# Patient Record
Sex: MALE | Race: OTHER | HISPANIC OR LATINO | URBAN - METROPOLITAN AREA
[De-identification: names, ages, dates, MRNs, and addresses within clinical notes are randomized per-mention and may not be internally consistent; named-entity substitution may affect disease eponyms.]

---

## 2020-03-29 ENCOUNTER — INPATIENT (INPATIENT)
Facility: HOSPITAL | Age: 55
LOS: 0 days | Discharge: ANOTHER HOSPITAL | DRG: 193 | End: 2020-03-30
Attending: HOSPITALIST | Admitting: HOSPITALIST
Payer: MEDICAID

## 2020-03-29 VITALS
RESPIRATION RATE: 20 BRPM | OXYGEN SATURATION: 85 % | TEMPERATURE: 102 F | HEART RATE: 106 BPM | WEIGHT: 199.96 LBS | HEIGHT: 65 IN | DIASTOLIC BLOOD PRESSURE: 70 MMHG | SYSTOLIC BLOOD PRESSURE: 110 MMHG

## 2020-03-29 DIAGNOSIS — J96.01 ACUTE RESPIRATORY FAILURE WITH HYPOXIA: ICD-10-CM

## 2020-03-29 DIAGNOSIS — R19.7 DIARRHEA, UNSPECIFIED: ICD-10-CM

## 2020-03-29 DIAGNOSIS — J18.9 PNEUMONIA, UNSPECIFIED ORGANISM: ICD-10-CM

## 2020-03-29 DIAGNOSIS — Z29.9 ENCOUNTER FOR PROPHYLACTIC MEASURES, UNSPECIFIED: ICD-10-CM

## 2020-03-29 LAB
ALBUMIN SERPL ELPH-MCNC: 3.1 G/DL — LOW (ref 3.5–5)
ALP SERPL-CCNC: 63 U/L — SIGNIFICANT CHANGE UP (ref 40–120)
ALT FLD-CCNC: 84 U/L DA — HIGH (ref 10–60)
ANION GAP SERPL CALC-SCNC: 7 MMOL/L — SIGNIFICANT CHANGE UP (ref 5–17)
APTT BLD: 35 SEC — SIGNIFICANT CHANGE UP (ref 27.5–36.3)
AST SERPL-CCNC: 79 U/L — HIGH (ref 10–40)
BASOPHILS # BLD AUTO: 0.02 K/UL — SIGNIFICANT CHANGE UP (ref 0–0.2)
BASOPHILS NFR BLD AUTO: 0.2 % — SIGNIFICANT CHANGE UP (ref 0–2)
BILIRUB SERPL-MCNC: 0.8 MG/DL — SIGNIFICANT CHANGE UP (ref 0.2–1.2)
BUN SERPL-MCNC: 25 MG/DL — HIGH (ref 7–18)
CALCIUM SERPL-MCNC: 9.1 MG/DL — SIGNIFICANT CHANGE UP (ref 8.4–10.5)
CHLORIDE SERPL-SCNC: 105 MMOL/L — SIGNIFICANT CHANGE UP (ref 96–108)
CO2 SERPL-SCNC: 24 MMOL/L — SIGNIFICANT CHANGE UP (ref 22–31)
CREAT SERPL-MCNC: 1.29 MG/DL — SIGNIFICANT CHANGE UP (ref 0.5–1.3)
EOSINOPHIL # BLD AUTO: 0 K/UL — SIGNIFICANT CHANGE UP (ref 0–0.5)
EOSINOPHIL NFR BLD AUTO: 0 % — SIGNIFICANT CHANGE UP (ref 0–6)
FLU A RESULT: SIGNIFICANT CHANGE UP
FLU A RESULT: SIGNIFICANT CHANGE UP
FLUAV AG NPH QL: SIGNIFICANT CHANGE UP
FLUBV AG NPH QL: SIGNIFICANT CHANGE UP
GLUCOSE SERPL-MCNC: 116 MG/DL — HIGH (ref 70–99)
HCT VFR BLD CALC: 47.2 % — SIGNIFICANT CHANGE UP (ref 39–50)
HGB BLD-MCNC: 15.8 G/DL — SIGNIFICANT CHANGE UP (ref 13–17)
IMM GRANULOCYTES NFR BLD AUTO: 0.7 % — SIGNIFICANT CHANGE UP (ref 0–1.5)
INR BLD: 1.3 RATIO — HIGH (ref 0.88–1.16)
LACTATE SERPL-SCNC: 1.3 MMOL/L — SIGNIFICANT CHANGE UP (ref 0.7–2)
LYMPHOCYTES # BLD AUTO: 0.81 K/UL — LOW (ref 1–3.3)
LYMPHOCYTES # BLD AUTO: 7.4 % — LOW (ref 13–44)
MCHC RBC-ENTMCNC: 29.8 PG — SIGNIFICANT CHANGE UP (ref 27–34)
MCHC RBC-ENTMCNC: 33.5 GM/DL — SIGNIFICANT CHANGE UP (ref 32–36)
MCV RBC AUTO: 88.9 FL — SIGNIFICANT CHANGE UP (ref 80–100)
MONOCYTES # BLD AUTO: 0.76 K/UL — SIGNIFICANT CHANGE UP (ref 0–0.9)
MONOCYTES NFR BLD AUTO: 7 % — SIGNIFICANT CHANGE UP (ref 2–14)
NEUTROPHILS # BLD AUTO: 9.23 K/UL — HIGH (ref 1.8–7.4)
NEUTROPHILS NFR BLD AUTO: 84.7 % — HIGH (ref 43–77)
NRBC # BLD: 0 /100 WBCS — SIGNIFICANT CHANGE UP (ref 0–0)
PLATELET # BLD AUTO: 321 K/UL — SIGNIFICANT CHANGE UP (ref 150–400)
POTASSIUM SERPL-MCNC: 4 MMOL/L — SIGNIFICANT CHANGE UP (ref 3.5–5.3)
POTASSIUM SERPL-SCNC: 4 MMOL/L — SIGNIFICANT CHANGE UP (ref 3.5–5.3)
PROT SERPL-MCNC: 8.9 G/DL — HIGH (ref 6–8.3)
PROTHROM AB SERPL-ACNC: 14.8 SEC — HIGH (ref 10–12.9)
RBC # BLD: 5.31 M/UL — SIGNIFICANT CHANGE UP (ref 4.2–5.8)
RBC # FLD: 13.3 % — SIGNIFICANT CHANGE UP (ref 10.3–14.5)
RSV RESULT: SIGNIFICANT CHANGE UP
RSV RNA RESP QL NAA+PROBE: SIGNIFICANT CHANGE UP
SODIUM SERPL-SCNC: 136 MMOL/L — SIGNIFICANT CHANGE UP (ref 135–145)
WBC # BLD: 10.9 K/UL — HIGH (ref 3.8–10.5)
WBC # FLD AUTO: 10.9 K/UL — HIGH (ref 3.8–10.5)

## 2020-03-29 PROCEDURE — 71045 X-RAY EXAM CHEST 1 VIEW: CPT | Mod: 26

## 2020-03-29 PROCEDURE — 99222 1ST HOSP IP/OBS MODERATE 55: CPT | Mod: AI,GC

## 2020-03-29 PROCEDURE — 99285 EMERGENCY DEPT VISIT HI MDM: CPT

## 2020-03-29 RX ORDER — ACETAMINOPHEN 500 MG
650 TABLET ORAL ONCE
Refills: 0 | Status: COMPLETED | OUTPATIENT
Start: 2020-03-29 | End: 2020-03-29

## 2020-03-29 RX ORDER — HYDROXYCHLOROQUINE SULFATE 200 MG
TABLET ORAL
Refills: 0 | Status: DISCONTINUED | OUTPATIENT
Start: 2020-03-29 | End: 2020-03-30

## 2020-03-29 RX ORDER — ACETAMINOPHEN 500 MG
650 TABLET ORAL EVERY 6 HOURS
Refills: 0 | Status: DISCONTINUED | OUTPATIENT
Start: 2020-03-29 | End: 2020-03-30

## 2020-03-29 RX ORDER — ENOXAPARIN SODIUM 100 MG/ML
40 INJECTION SUBCUTANEOUS DAILY
Refills: 0 | Status: DISCONTINUED | OUTPATIENT
Start: 2020-03-29 | End: 2020-03-30

## 2020-03-29 RX ORDER — ASCORBIC ACID 60 MG
500 TABLET,CHEWABLE ORAL THREE TIMES A DAY
Refills: 0 | Status: DISCONTINUED | OUTPATIENT
Start: 2020-03-29 | End: 2020-03-30

## 2020-03-29 RX ORDER — SODIUM CHLORIDE 9 MG/ML
1000 INJECTION INTRAMUSCULAR; INTRAVENOUS; SUBCUTANEOUS
Refills: 0 | Status: DISCONTINUED | OUTPATIENT
Start: 2020-03-29 | End: 2020-03-29

## 2020-03-29 RX ORDER — HYDROXYCHLOROQUINE SULFATE 200 MG
200 TABLET ORAL EVERY 12 HOURS
Refills: 0 | Status: DISCONTINUED | OUTPATIENT
Start: 2020-03-30 | End: 2020-03-30

## 2020-03-29 RX ORDER — CEFTRIAXONE 500 MG/1
INJECTION, POWDER, FOR SOLUTION INTRAMUSCULAR; INTRAVENOUS
Refills: 0 | Status: DISCONTINUED | OUTPATIENT
Start: 2020-03-29 | End: 2020-03-30

## 2020-03-29 RX ORDER — HYDROXYCHLOROQUINE SULFATE 200 MG
400 TABLET ORAL EVERY 12 HOURS
Refills: 0 | Status: COMPLETED | OUTPATIENT
Start: 2020-03-29 | End: 2020-03-30

## 2020-03-29 RX ORDER — CEFTRIAXONE 500 MG/1
1000 INJECTION, POWDER, FOR SOLUTION INTRAMUSCULAR; INTRAVENOUS EVERY 24 HOURS
Refills: 0 | Status: DISCONTINUED | OUTPATIENT
Start: 2020-03-30 | End: 2020-03-30

## 2020-03-29 RX ORDER — SODIUM CHLORIDE 9 MG/ML
1000 INJECTION INTRAMUSCULAR; INTRAVENOUS; SUBCUTANEOUS
Refills: 0 | Status: DISCONTINUED | OUTPATIENT
Start: 2020-03-29 | End: 2020-03-30

## 2020-03-29 RX ORDER — CEFTRIAXONE 500 MG/1
1000 INJECTION, POWDER, FOR SOLUTION INTRAMUSCULAR; INTRAVENOUS ONCE
Refills: 0 | Status: COMPLETED | OUTPATIENT
Start: 2020-03-29 | End: 2020-03-29

## 2020-03-29 RX ORDER — THIAMINE MONONITRATE (VIT B1) 100 MG
200 TABLET ORAL DAILY
Refills: 0 | Status: DISCONTINUED | OUTPATIENT
Start: 2020-03-29 | End: 2020-03-30

## 2020-03-29 RX ORDER — AZITHROMYCIN 500 MG/1
500 TABLET, FILM COATED ORAL DAILY
Refills: 0 | Status: DISCONTINUED | OUTPATIENT
Start: 2020-03-29 | End: 2020-03-30

## 2020-03-29 RX ADMIN — AZITHROMYCIN 500 MILLIGRAM(S): 500 TABLET, FILM COATED ORAL at 18:00

## 2020-03-29 RX ADMIN — Medication 650 MILLIGRAM(S): at 14:05

## 2020-03-29 RX ADMIN — CEFTRIAXONE 100 MILLIGRAM(S): 500 INJECTION, POWDER, FOR SOLUTION INTRAMUSCULAR; INTRAVENOUS at 18:00

## 2020-03-29 NOTE — H&P ADULT - HISTORY OF PRESENT ILLNESS
Patient is a 55 year old male from home with No significant  PMHx presented to ED with cough and SOB with 7 days. Patient reports of cough, low grade fever, SOB, abdominal discomfort and Diarrhea. Patient reports of 10 episodes of watery bowel movements since 2-3 days. Patient denies smoking, asthma, copd, Diabetes, HTN or h/o Heart disease. Patient denies headaches, nausea, vomiting, dysuria, skin rashes or focal motor/ sensory deficits  denies smoking or alcohol abuse     on arrival pt was febrile, Tmax 102F, HD stable, tachypneic and Hypoxic on room air   cbc showed mild leucocytosis  normal electrolytes and renal functions   CXR small infiltrate in the left mid lateral lung field. Patient is a 54 year old male from home with No significant  PMHx presented to ED with cough and SOB with 7 days. Patient reports of cough, low grade fever, SOB, abdominal discomfort and Diarrhea. Patient reports of 10 episodes of watery bowel movements since 2-3 days. Patient denies smoking, asthma, copd, Diabetes, HTN or h/o Heart disease. Patient denies headaches, nausea, vomiting, dysuria, skin rashes or focal motor/ sensory deficits  denies smoking or alcohol abuse     on arrival pt was febrile, Tmax 102F, HD stable, tachypneic and Hypoxic on room air   cbc showed mild leucocytosis  normal electrolytes and renal functions   CXR small infiltrate in the left mid lateral lung field.

## 2020-03-29 NOTE — ED ADULT NURSE NOTE - NSIMPLEMENTINTERV_GEN_ALL_ED
Implemented All Universal Safety Interventions:  [place] to call system. Call bell, personal items and telephone within reach. Instruct patient to call for assistance. Room bathroom lighting operational. Non-slip footwear when patient is off stretcher. Physically safe environment: no spills, clutter or unnecessary equipment. Stretcher in lowest position, wheels locked, appropriate side rails in place.

## 2020-03-29 NOTE — H&P ADULT - PROBLEM SELECTOR PLAN 2
Patient was found to be hypoxic on room air   Hypoxic likely due to PNA   continue with O2 supplementation   IV antibiotics

## 2020-03-29 NOTE — H&P ADULT - PROBLEM SELECTOR PLAN 1
Patient presented with sob, fever, and Diarrhea  on arrival pt was febrile, Tmax 102F, HD stable, tachypneic and Hypoxic on room air   cbc showed mild leucocytosis  normal electrolytes and renal functions   CXR small infiltrate in the left mid lateral lung field.   continue with Rocephin and Azithromax   f/u FLU, RVP, COVID 19, Procalcitonin   f/u Blood cx Patient presented with sob, fever, and Diarrhea  on arrival pt was febrile, Tmax 102F, HD stable, tachypneic and Hypoxic on room air   cbc showed mild leucocytosis  normal electrolytes and renal functions   CXR small infiltrate in the left mid lateral lung field.   continue with Rocephin and Azithromax   f/u FLU, RVP, COVID 19, Procalcitonin   f/u Blood cx  Hydroxy chloroquine for COVID coverage

## 2020-03-29 NOTE — ED PROVIDER NOTE - CLINICAL SUMMARY MEDICAL DECISION MAKING FREE TEXT BOX
pt presents with flu like symptoms.  Pt febrile, hypoxic, tachypnea.  Likely covid.  pt on supplemental oxygen.  Will check labs, CXR, reassess for admission.

## 2020-03-29 NOTE — ED PROVIDER NOTE - OBJECTIVE STATEMENT
53 y/o male, denies past medical history, denies smoking history presents with cough and fever for over 1 week.  Symptoms associated with fever, myalgias, and shortness of breath.  In ED, patient febrile to 102, Sat of 85% on room air, tachypneic.

## 2020-03-29 NOTE — H&P ADULT - NSHPPHYSICALEXAM_GEN_ALL_CORE
Vital Signs Last 24 Hrs  T(C): 36.3 (29 Mar 2020 16:34), Max: 39 (29 Mar 2020 11:08)  T(F): 97.4 (29 Mar 2020 16:34), Max: 102.2 (29 Mar 2020 11:08)  HR: 87 (29 Mar 2020 16:34) (87 - 115)  BP: 101/66 (29 Mar 2020 16:34) (101/66 - 110/70)  BP(mean): --  RR: 19 (29 Mar 2020 16:34) (19 - 24)  SpO2: 100% (29 Mar 2020 16:34) (85% - 100%)    PHYSICAL EXAM:  GENERAL: Mild respiratory distress   HEAD:  Atraumatic, Normocephalic  EYES: EOMI, PERRLA, conjunctiva and sclera clear  NECK: Supple, No JVD  CHEST/LUNG: b/l basal rhonchi, no wheezing   HEART: Regular rate and rhythm; No murmurs;   ABDOMEN: Soft, Nontender, distended; Bowel sounds present; No guarding  EXTREMITIES:  2+ Peripheral Pulses, No cyanosis or edema  NEUROLOGY: AAOx3 non-focal  SKIN: No rashes or lesions

## 2020-03-29 NOTE — ED ADULT NURSE NOTE - ED STAT RN HANDOFF DETAILS 2
received pt.in bed at 1910  pt.is alert and oriented x3.denies pain. transfer to rm 621 c REPORT GIVEN TO JASBIR LITTLE.no acute distress noted

## 2020-03-30 ENCOUNTER — INPATIENT (INPATIENT)
Facility: HOSPITAL | Age: 55
LOS: 6 days | Discharge: HOME | End: 2020-04-06
Payer: MEDICAID

## 2020-03-30 VITALS — DIASTOLIC BLOOD PRESSURE: 69 MMHG | HEART RATE: 89 BPM | SYSTOLIC BLOOD PRESSURE: 109 MMHG

## 2020-03-30 LAB — SARS-COV-2 RNA SPEC QL NAA+PROBE: SIGNIFICANT CHANGE UP

## 2020-03-30 PROCEDURE — 99232 SBSQ HOSP IP/OBS MODERATE 35: CPT | Mod: GC

## 2020-03-30 PROCEDURE — 87040 BLOOD CULTURE FOR BACTERIA: CPT

## 2020-03-30 PROCEDURE — 71045 X-RAY EXAM CHEST 1 VIEW: CPT

## 2020-03-30 PROCEDURE — 87631 RESP VIRUS 3-5 TARGETS: CPT

## 2020-03-30 PROCEDURE — 93005 ELECTROCARDIOGRAM TRACING: CPT

## 2020-03-30 PROCEDURE — 80053 COMPREHEN METABOLIC PANEL: CPT

## 2020-03-30 PROCEDURE — 71250 CT THORAX DX C-: CPT | Mod: 26

## 2020-03-30 PROCEDURE — 99285 EMERGENCY DEPT VISIT HI MDM: CPT | Mod: 25

## 2020-03-30 PROCEDURE — 85027 COMPLETE CBC AUTOMATED: CPT

## 2020-03-30 PROCEDURE — 87635 SARS-COV-2 COVID-19 AMP PRB: CPT

## 2020-03-30 PROCEDURE — 36415 COLL VENOUS BLD VENIPUNCTURE: CPT

## 2020-03-30 PROCEDURE — 85610 PROTHROMBIN TIME: CPT

## 2020-03-30 PROCEDURE — 71250 CT THORAX DX C-: CPT

## 2020-03-30 PROCEDURE — 85730 THROMBOPLASTIN TIME PARTIAL: CPT

## 2020-03-30 PROCEDURE — 83605 ASSAY OF LACTIC ACID: CPT

## 2020-03-30 RX ORDER — FUROSEMIDE 40 MG
20 TABLET ORAL ONCE
Refills: 0 | Status: COMPLETED | OUTPATIENT
Start: 2020-03-30 | End: 2020-03-30

## 2020-03-30 RX ORDER — THIAMINE MONONITRATE (VIT B1) 100 MG
2 TABLET ORAL
Qty: 0 | Refills: 0 | DISCHARGE
Start: 2020-03-30

## 2020-03-30 RX ORDER — ACETAMINOPHEN 500 MG
2 TABLET ORAL
Qty: 0 | Refills: 0 | DISCHARGE
Start: 2020-03-30

## 2020-03-30 RX ORDER — ASCORBIC ACID 60 MG
1 TABLET,CHEWABLE ORAL
Qty: 0 | Refills: 0 | DISCHARGE
Start: 2020-03-30

## 2020-03-30 RX ORDER — HYDROXYCHLOROQUINE SULFATE 200 MG
400 TABLET ORAL
Qty: 0 | Refills: 0 | DISCHARGE
Start: 2020-03-30

## 2020-03-30 RX ORDER — AZITHROMYCIN 500 MG/1
1 TABLET, FILM COATED ORAL
Qty: 0 | Refills: 0 | DISCHARGE
Start: 2020-03-30

## 2020-03-30 RX ORDER — ENOXAPARIN SODIUM 100 MG/ML
40 INJECTION SUBCUTANEOUS
Qty: 0 | Refills: 0 | DISCHARGE
Start: 2020-03-30

## 2020-03-30 RX ADMIN — Medication 650 MILLIGRAM(S): at 00:48

## 2020-03-30 RX ADMIN — ENOXAPARIN SODIUM 40 MILLIGRAM(S): 100 INJECTION SUBCUTANEOUS at 12:00

## 2020-03-30 RX ADMIN — Medication 20 MILLIGRAM(S): at 17:30

## 2020-03-30 RX ADMIN — Medication 500 MILLIGRAM(S): at 05:59

## 2020-03-30 RX ADMIN — Medication 400 MILLIGRAM(S): at 05:59

## 2020-03-30 RX ADMIN — AZITHROMYCIN 500 MILLIGRAM(S): 500 TABLET, FILM COATED ORAL at 12:00

## 2020-03-30 RX ADMIN — CEFTRIAXONE 100 MILLIGRAM(S): 500 INJECTION, POWDER, FOR SOLUTION INTRAMUSCULAR; INTRAVENOUS at 17:30

## 2020-03-30 RX ADMIN — Medication 500 MILLIGRAM(S): at 13:14

## 2020-03-30 RX ADMIN — Medication 200 MILLIGRAM(S): at 12:00

## 2020-03-30 NOTE — PROGRESS NOTE ADULT - ASSESSMENT
Patient is a 55 year old male from home with No significant  PMHx presented to ED with cough and SOB with 7 days. Patient reports of cough, low grade fever, SOB, abdominal discomfort and Diarrhea. Patient reports of 10 episodes of watery bowel movements since 2-3 days. Patient denies smoking, asthma, copd, Diabetes, HTN or h/o Heart disease. Patient denies headaches, nausea, vomiting, dysuria, skin rashes or focal motor/ sensory deficits  denies smoking or alcohol abuse     on arrival pt was febrile, Tmax 102F, HD stable, tachypneic and Hypoxic on room air   cbc showed mild leucocytosis  normal electrolytes and renal functions   CXR small infiltrate in the left mid lateral lung field.

## 2020-03-30 NOTE — PROGRESS NOTE ADULT - PROBLEM SELECTOR PLAN 1
Patient presented with sob, fever, and Diarrhea  on arrival pt was febrile, Tmax 102F, HD stable, tachypneic and Hypoxic on room air   cbc showed mild leucocytosis  normal electrolytes and renal functions   CXR small infiltrate in the left mid lateral lung field.   continue with Rocephin and Azithromax   FLU/ RVP negative   COVID 19 pending   f/u Procalcitonin   f/u Blood cx  Hydroxy chloroquine for COVID coverage

## 2020-03-30 NOTE — ACUTE INTERFACILITY TRANSFER NOTE - HOSPITAL COURSE
55 year old male from home with No significant  PMHx presented to ED with cough and SOB with 7 days. Patient reports of cough, low grade fever, SOB, abdominal discomfort and Diarrhea. Patient reports of 10 episodes of watery bowel movements since 2-3 days. 55 year old male from home with No significant  PMHx presented to ED with cough and SOB with 7 days. Patient reports of cough, low grade fever, SOB, abdominal discomfort and Diarrhea. Patient reports of 10 episodes of watery bowel movements since 2-3 days.  COVID -19 PCR came back negative. d/arturo plaquenil  c/w supplemental O2 via N-C 54 year old male from home with No significant  PMHx presented to ED with cough and SOB with 7 days. Patient reports of cough, low grade fever, SOB, abdominal discomfort and Diarrhea. Patient reports of 10 episodes of watery bowel movements since 2-3 days.  COVID -19 PCR came back negative. d/arturo plaquenil  c/w supplemental O2 via N-C

## 2020-03-30 NOTE — CHART NOTE - NSCHARTNOTEFT_GEN_A_CORE
Informed by Nursing Staff re negative results on COVID19;   Pt remains hypoxic, SpO2 92% on 4L NC.   Will give Lasix 20mg IV x 1 dose   F/u CT chest, eval for hypoxemia  D/w Dr Berrios and agrees

## 2020-03-30 NOTE — ACUTE INTERFACILITY TRANSFER NOTE - ENOXAPARIN/LOVENOX EDUCATION
Enoxaparin/Lovenox – Potential for Adverse Drug Reaction and Interactions/Enoxaparin/Lovenox – Follow up Monitoring/Enoxaparin/Lovenox/ – Compliance/Enoxaparin/Lovenox/ – Dietary Advice

## 2020-03-30 NOTE — ACUTE INTERFACILITY TRANSFER NOTE - PLAN OF CARE
CAP c/o COVID Continue with Isolation  Continue with Azithromycin & Rocephin  Continue with Tylenol  Continue with IV Fluids  Continue with supportive care  Follow up lab results Continue with Isolation  Continue with Azithromycin & Rocephin  Continue with Tylenol  Continue with IV Fluids  Continue with supportive care  Follow up lab results  COVID-19 PCR came back negative -- d/arturo plaquenil  c/w supplemental O2 via NC and monitor O2 sat

## 2020-03-30 NOTE — PROGRESS NOTE ADULT - ATTENDING COMMENTS
Patient was seen and examined by myself. Case was discussed with house staff in details. I have reviewed and agree with the plan as outlined above with edits where appropriate.    Vital Signs Last 24 Hrs  T(C): 37.2 (30 Mar 2020 14:35), Max: 37.3 (30 Mar 2020 01:57)  T(F): 98.9 (30 Mar 2020 14:35), Max: 99.1 (30 Mar 2020 01:57)  HR: 89 (30 Mar 2020 17:48) (80 - 106)  BP: 109/69 (30 Mar 2020 17:48) (102/58 - 125/85)  RR: 18 (30 Mar 2020 14:35) (18 - 20)  SpO2: 96% (30 Mar 2020 14:35) (92% - 100%)    03-29    136  |  105  |  25<H>  ----------------------------<  116<H>  4.0   |  24  |  1.29    Ca    9.1      29 Mar 2020 13:57    TPro  8.9<H>  /  Alb  3.1<L>  /  TBili  0.8  /  DBili  x   /  AST  79<H>  /  ALT  84<H>  /  AlkPhos  63  03-29      A/P: Bilateral PNA- COVID PCR negative  Continue antibiotics  Obtain CT chest  Monitor  Lasix foe pulm congestion

## 2020-03-30 NOTE — PROGRESS NOTE ADULT - SUBJECTIVE AND OBJECTIVE BOX
INTERVAL HPI/OVERNIGHT EVENTS:    Pt resting comfortably. No acute complaints. Cough improved. No abd pain/ n/v; no diarrhea   On NRB SpO2 100%      MEDICATIONS  (STANDING):  ascorbic acid 500 milliGRAM(s) Oral three times a day  azithromycin   Tablet 500 milliGRAM(s) Oral daily  cefTRIAXone   IVPB      cefTRIAXone   IVPB 1000 milliGRAM(s) IV Intermittent every 24 hours  enoxaparin Injectable 40 milliGRAM(s) SubCutaneous daily  hydroxychloroquine 200 milliGRAM(s) Oral every 12 hours  hydroxychloroquine   Oral   sodium chloride 0.9%. 1000 milliLiter(s) (100 mL/Hr) IV Continuous <Continuous>  thiamine 200 milliGRAM(s) Oral daily    MEDICATIONS  (PRN):  acetaminophen   Tablet .. 650 milliGRAM(s) Oral every 6 hours PRN Temp greater or equal to 38C (100.4F)      Vital Signs Last 24 Hrs  T(C): 36.8 (30 Mar 2020 06:00), Max: 39 (29 Mar 2020 11:08)  T(F): 98.2 (30 Mar 2020 06:00), Max: 102.2 (29 Mar 2020 11:08)  HR: 82 (30 Mar 2020 06:00) (82 - 115)  BP: 108/74 (30 Mar 2020 06:00) (101/66 - 125/85)  BP(mean): --  RR: 20 (30 Mar 2020 06:00) (19 - 24)  SpO2: 100% (30 Mar 2020 06:00) (85% - 100%)    Physical:  General: A&Ox3. NAD.  Chest: Symmetrical rise of chest without use of accessory muscles.  Abdomen: Soft nondistended, nontender.  LE: Warm, no calf tenderness b/l.    I&O's Detail      LABS:                        15.8   10.90 )-----------( 321      ( 29 Mar 2020 14:02 )             47.2             03-29    136  |  105  |  25<H>  ----------------------------<  116<H>  4.0   |  24  |  1.29    Ca    9.1      29 Mar 2020 13:57    TPro  8.9<H>  /  Alb  3.1<L>  /  TBili  0.8  /  DBili  x   /  AST  79<H>  /  ALT  84<H>  /  AlkPhos  63  03-29

## 2020-03-31 LAB — SARS-COV-2 RNA SPEC QL NAA+PROBE: DETECTED

## 2020-03-31 PROCEDURE — 71046 X-RAY EXAM CHEST 2 VIEWS: CPT | Mod: 26

## 2020-04-01 ENCOUNTER — OUTPATIENT (OUTPATIENT)
Dept: OUTPATIENT SERVICES | Facility: HOSPITAL | Age: 55
LOS: 1 days | End: 2020-04-01

## 2020-04-02 ENCOUNTER — OUTPATIENT (OUTPATIENT)
Dept: OUTPATIENT SERVICES | Facility: HOSPITAL | Age: 55
LOS: 1 days | End: 2020-04-02

## 2020-04-03 ENCOUNTER — OUTPATIENT (OUTPATIENT)
Dept: OUTPATIENT SERVICES | Facility: HOSPITAL | Age: 55
LOS: 1 days | End: 2020-04-03

## 2020-04-03 LAB
CULTURE RESULTS: SIGNIFICANT CHANGE UP
CULTURE RESULTS: SIGNIFICANT CHANGE UP
SPECIMEN SOURCE: SIGNIFICANT CHANGE UP
SPECIMEN SOURCE: SIGNIFICANT CHANGE UP

## 2020-04-04 ENCOUNTER — OUTPATIENT (OUTPATIENT)
Dept: OUTPATIENT SERVICES | Facility: HOSPITAL | Age: 55
LOS: 1 days | End: 2020-04-04

## 2020-04-05 ENCOUNTER — OUTPATIENT (OUTPATIENT)
Dept: OUTPATIENT SERVICES | Facility: HOSPITAL | Age: 55
LOS: 1 days | End: 2020-04-05

## 2020-04-06 ENCOUNTER — INPATIENT (INPATIENT)
Facility: HOSPITAL | Age: 55
LOS: 1 days | Discharge: ANOTHER HOSPITAL | DRG: 193 | End: 2020-04-08
Attending: INTERNAL MEDICINE | Admitting: INTERNAL MEDICINE
Payer: MEDICAID

## 2020-04-06 VITALS
WEIGHT: 199.96 LBS | HEIGHT: 65 IN | SYSTOLIC BLOOD PRESSURE: 148 MMHG | HEART RATE: 112 BPM | RESPIRATION RATE: 26 BRPM | TEMPERATURE: 99 F | OXYGEN SATURATION: 74 % | DIASTOLIC BLOOD PRESSURE: 82 MMHG

## 2020-04-06 LAB
ALBUMIN SERPL ELPH-MCNC: 2.9 G/DL — LOW (ref 3.5–5)
ALP SERPL-CCNC: 109 U/L — SIGNIFICANT CHANGE UP (ref 40–120)
ALT FLD-CCNC: 167 U/L DA — HIGH (ref 10–60)
ANION GAP SERPL CALC-SCNC: 8 MMOL/L — SIGNIFICANT CHANGE UP (ref 5–17)
AST SERPL-CCNC: 64 U/L — HIGH (ref 10–40)
BASOPHILS # BLD AUTO: 0.04 K/UL — SIGNIFICANT CHANGE UP (ref 0–0.2)
BASOPHILS NFR BLD AUTO: 0.4 % — SIGNIFICANT CHANGE UP (ref 0–2)
BILIRUB SERPL-MCNC: 0.3 MG/DL — SIGNIFICANT CHANGE UP (ref 0.2–1.2)
BUN SERPL-MCNC: 19 MG/DL — HIGH (ref 7–18)
CALCIUM SERPL-MCNC: 9.1 MG/DL — SIGNIFICANT CHANGE UP (ref 8.4–10.5)
CHLORIDE SERPL-SCNC: 107 MMOL/L — SIGNIFICANT CHANGE UP (ref 96–108)
CO2 SERPL-SCNC: 25 MMOL/L — SIGNIFICANT CHANGE UP (ref 22–31)
CREAT SERPL-MCNC: 0.96 MG/DL — SIGNIFICANT CHANGE UP (ref 0.5–1.3)
D DIMER BLD IA.RAPID-MCNC: 375 NG/ML DDU — HIGH
EOSINOPHIL # BLD AUTO: 0.03 K/UL — SIGNIFICANT CHANGE UP (ref 0–0.5)
EOSINOPHIL NFR BLD AUTO: 0.3 % — SIGNIFICANT CHANGE UP (ref 0–6)
ERYTHROCYTE [SEDIMENTATION RATE] IN BLOOD: 46 MM/HR — HIGH (ref 0–20)
GLUCOSE SERPL-MCNC: 118 MG/DL — HIGH (ref 70–99)
HCT VFR BLD CALC: 46.8 % — SIGNIFICANT CHANGE UP (ref 39–50)
HGB BLD-MCNC: 15.5 G/DL — SIGNIFICANT CHANGE UP (ref 13–17)
IMM GRANULOCYTES NFR BLD AUTO: 4.2 % — HIGH (ref 0–1.5)
LDH SERPL L TO P-CCNC: 293 U/L — HIGH (ref 120–225)
LYMPHOCYTES # BLD AUTO: 1.29 K/UL — SIGNIFICANT CHANGE UP (ref 1–3.3)
LYMPHOCYTES # BLD AUTO: 12.1 % — LOW (ref 13–44)
MCHC RBC-ENTMCNC: 29.8 PG — SIGNIFICANT CHANGE UP (ref 27–34)
MCHC RBC-ENTMCNC: 33.1 GM/DL — SIGNIFICANT CHANGE UP (ref 32–36)
MCV RBC AUTO: 90 FL — SIGNIFICANT CHANGE UP (ref 80–100)
MONOCYTES # BLD AUTO: 0.65 K/UL — SIGNIFICANT CHANGE UP (ref 0–0.9)
MONOCYTES NFR BLD AUTO: 6.1 % — SIGNIFICANT CHANGE UP (ref 2–14)
NEUTROPHILS # BLD AUTO: 8.2 K/UL — HIGH (ref 1.8–7.4)
NEUTROPHILS NFR BLD AUTO: 76.9 % — SIGNIFICANT CHANGE UP (ref 43–77)
NRBC # BLD: 0 /100 WBCS — SIGNIFICANT CHANGE UP (ref 0–0)
PLATELET # BLD AUTO: 598 K/UL — HIGH (ref 150–400)
POTASSIUM SERPL-MCNC: 3.9 MMOL/L — SIGNIFICANT CHANGE UP (ref 3.5–5.3)
POTASSIUM SERPL-SCNC: 3.9 MMOL/L — SIGNIFICANT CHANGE UP (ref 3.5–5.3)
PROT SERPL-MCNC: 8.1 G/DL — SIGNIFICANT CHANGE UP (ref 6–8.3)
RBC # BLD: 5.2 M/UL — SIGNIFICANT CHANGE UP (ref 4.2–5.8)
RBC # FLD: 12.9 % — SIGNIFICANT CHANGE UP (ref 10.3–14.5)
SODIUM SERPL-SCNC: 140 MMOL/L — SIGNIFICANT CHANGE UP (ref 135–145)
WBC # BLD: 10.66 K/UL — HIGH (ref 3.8–10.5)
WBC # FLD AUTO: 10.66 K/UL — HIGH (ref 3.8–10.5)

## 2020-04-06 PROCEDURE — 71045 X-RAY EXAM CHEST 1 VIEW: CPT | Mod: 26

## 2020-04-06 PROCEDURE — 99285 EMERGENCY DEPT VISIT HI MDM: CPT

## 2020-04-06 RX ORDER — ACETAMINOPHEN 500 MG
650 TABLET ORAL ONCE
Refills: 0 | Status: COMPLETED | OUTPATIENT
Start: 2020-04-06 | End: 2020-04-06

## 2020-04-06 RX ADMIN — Medication 650 MILLIGRAM(S): at 23:51

## 2020-04-06 NOTE — ED PROVIDER NOTE - PHYSICAL EXAMINATION
Vital signs reviewed and are as documented.  GENERAL: no acute distress, no conversational dyspnea, no lethargy  HEAD: Atraumatic  ENT: performed visually  to limit exposure risk to COVID 19; no drooling, no muffled voice, no trismus  NECK: trachea midline, no visible masses, no visible JVD  CARDIO: auscultation deferred to limit exposure risk to COVID 19, HR as documented in vital signs  RESPIRATORY: no conversational dyspnea, No respiratory distress.  No visible increased work of breathing,  auscultation deferred to limit exposure risk to COVID 19  EXTREMITIES: moves all extremities spontaneously  NEURO: Awake and alert.  No gross motor deficits.  Ambulates independently.  PSYCH: calm, cooperative

## 2020-04-06 NOTE — ED PROVIDER NOTE - TOBACCO USE
within 2 days if no indications for return to ED         CRITICAL CARE:       PROCEDURES:    Procedures    DIAGNOSTIC RESULTS   EKG:All EKG's are interpreted by the Emergency Department Physician who either signs or Co-signs this chart in the absence of a cardiologist.        RADIOLOGY:All plain film, CT, MRI, and formal ultrasound images (except ED bedside ultrasound) are read by the radiologist, see reports below, unless otherwisenoted in MDM or here. No orders to display     LABS: All lab results were reviewed by myself, and all abnormals are listed below. Labs Reviewed   RAPID INFLUENZA A/B ANTIGENS - Abnormal; Notable for the following components:       Result Value    Direct Exam POSITIVE for Influenza B Antigen (*)     All other components within normal limits       EMERGENCY DEPARTMENTCOURSE:         Vitals:    Vitals:    01/08/20 2010 01/08/20 2011   BP: 109/67    Pulse: 76    Resp: 16    Temp: 97.5 °F (36.4 °C)    TempSrc: Oral    SpO2: 96%    Weight:  128 lb 4.8 oz (58.2 kg)   Height:  5' 6.5\" (1.689 m)       The patient was given the following medications while in the emergency department:  Orders Placed This Encounter   Medications    benzonatate (TESSALON) capsule 100 mg    benzonatate (TESSALON) 200 MG capsule     Sig: Take 1 capsule by mouth 3 times daily as needed for Cough     Dispense:  20 capsule     Refill:  0     CONSULTS:  None    FINAL IMPRESSION      1.  Influenza B          DISPOSITION/PLAN   DISPOSITION        PATIENT REFERRED TO:  Jimmy Graves MD  [address] [place] [address]  [phone]    In 2 days      DISCHARGE MEDICATIONS:  Discharge Medication List as of 1/8/2020 10:10 PM      START taking these medications    Details   benzonatate (TESSALON) 200 MG capsule Take 1 capsule by mouth 3 times daily as needed for Cough, Disp-20 capsule, R-0Print           Yanet Simons MD  Attending Emergency Physician                    Libertad Valentin MD  01/08/20 [phone] Never smoker

## 2020-04-06 NOTE — ED ADULT TRIAGE NOTE - CHIEF COMPLAINT QUOTE
patient discharged x today from outside hospital for +COVID-19. Patient complaining of shortness of breath

## 2020-04-06 NOTE — ED PROVIDER NOTE - CLINICAL SUMMARY MEDICAL DECISION MAKING FREE TEXT BOX
Pox 95% on NRBM, however POx desats to 89% RA.  MAR and Dr. Malagon endorsed. Pt agrees with admission for respiratory monitoring. I had a detailed discussion with the patient and/or guardian regarding the historical points, exam findings, and any diagnostic results supporting the admit diagnosis.

## 2020-04-06 NOTE — ED PROVIDER NOTE - OBJECTIVE STATEMENT
Chief complaint of fever, chills body aches, nonproductive coughing, shortness of breath  x 2 weeks  Pt’s symptoms are during current COVID-19 Pandemic crisis in [place].   +COVID 19 reported on 3/30/20.   Pt states he was discharged from hospital yesterday in [place]. pt does not know name of hospital.   Pt currently on NRBM O2 and feels better. No respiratory distress, able to speak full sentences.

## 2020-04-07 VITALS
RESPIRATION RATE: 18 BRPM | DIASTOLIC BLOOD PRESSURE: 71 MMHG | TEMPERATURE: 98 F | OXYGEN SATURATION: 95 % | SYSTOLIC BLOOD PRESSURE: 108 MMHG | HEART RATE: 94 BPM

## 2020-04-07 DIAGNOSIS — B97.21 SARS-ASSOCIATED CORONAVIRUS AS THE CAUSE OF DISEASES CLASSIFIED ELSEWHERE: ICD-10-CM

## 2020-04-07 DIAGNOSIS — Z29.9 ENCOUNTER FOR PROPHYLACTIC MEASURES, UNSPECIFIED: ICD-10-CM

## 2020-04-07 LAB
ALBUMIN SERPL ELPH-MCNC: 2.9 G/DL — LOW (ref 3.5–5)
ALP SERPL-CCNC: 97 U/L — SIGNIFICANT CHANGE UP (ref 40–120)
ALT FLD-CCNC: 132 U/L DA — HIGH (ref 10–60)
ANION GAP SERPL CALC-SCNC: 7 MMOL/L — SIGNIFICANT CHANGE UP (ref 5–17)
AST SERPL-CCNC: 42 U/L — HIGH (ref 10–40)
BASOPHILS # BLD AUTO: 0.06 K/UL — SIGNIFICANT CHANGE UP (ref 0–0.2)
BASOPHILS NFR BLD AUTO: 0.6 % — SIGNIFICANT CHANGE UP (ref 0–2)
BILIRUB SERPL-MCNC: 0.4 MG/DL — SIGNIFICANT CHANGE UP (ref 0.2–1.2)
BUN SERPL-MCNC: 23 MG/DL — HIGH (ref 7–18)
CALCIUM SERPL-MCNC: 8.9 MG/DL — SIGNIFICANT CHANGE UP (ref 8.4–10.5)
CHLORIDE SERPL-SCNC: 107 MMOL/L — SIGNIFICANT CHANGE UP (ref 96–108)
CHOLEST SERPL-MCNC: 184 MG/DL — SIGNIFICANT CHANGE UP (ref 10–199)
CO2 SERPL-SCNC: 27 MMOL/L — SIGNIFICANT CHANGE UP (ref 22–31)
CREAT SERPL-MCNC: 0.93 MG/DL — SIGNIFICANT CHANGE UP (ref 0.5–1.3)
CRP SERPL-MCNC: 0.86 MG/DL — HIGH (ref 0–0.4)
EOSINOPHIL # BLD AUTO: 0.13 K/UL — SIGNIFICANT CHANGE UP (ref 0–0.5)
EOSINOPHIL NFR BLD AUTO: 1.4 % — SIGNIFICANT CHANGE UP (ref 0–6)
FERRITIN SERPL-MCNC: 692 NG/ML — HIGH (ref 30–400)
GLUCOSE SERPL-MCNC: 130 MG/DL — HIGH (ref 70–99)
HBA1C BLD-MCNC: 6.2 % — HIGH (ref 4–5.6)
HCT VFR BLD CALC: 44.5 % — SIGNIFICANT CHANGE UP (ref 39–50)
HDLC SERPL-MCNC: 36 MG/DL — LOW
HGB BLD-MCNC: 14.4 G/DL — SIGNIFICANT CHANGE UP (ref 13–17)
IMM GRANULOCYTES NFR BLD AUTO: 3.8 % — HIGH (ref 0–1.5)
INR BLD: 1.21 RATIO — HIGH (ref 0.88–1.16)
LIPID PNL WITH DIRECT LDL SERPL: 117 MG/DL — SIGNIFICANT CHANGE UP
LYMPHOCYTES # BLD AUTO: 1.41 K/UL — SIGNIFICANT CHANGE UP (ref 1–3.3)
LYMPHOCYTES # BLD AUTO: 15.2 % — SIGNIFICANT CHANGE UP (ref 13–44)
MAGNESIUM SERPL-MCNC: 2.4 MG/DL — SIGNIFICANT CHANGE UP (ref 1.6–2.6)
MCHC RBC-ENTMCNC: 29.6 PG — SIGNIFICANT CHANGE UP (ref 27–34)
MCHC RBC-ENTMCNC: 32.4 GM/DL — SIGNIFICANT CHANGE UP (ref 32–36)
MCV RBC AUTO: 91.4 FL — SIGNIFICANT CHANGE UP (ref 80–100)
MONOCYTES # BLD AUTO: 0.62 K/UL — SIGNIFICANT CHANGE UP (ref 0–0.9)
MONOCYTES NFR BLD AUTO: 6.7 % — SIGNIFICANT CHANGE UP (ref 2–14)
NEUTROPHILS # BLD AUTO: 6.7 K/UL — SIGNIFICANT CHANGE UP (ref 1.8–7.4)
NEUTROPHILS NFR BLD AUTO: 72.3 % — SIGNIFICANT CHANGE UP (ref 43–77)
NRBC # BLD: 0 /100 WBCS — SIGNIFICANT CHANGE UP (ref 0–0)
PHOSPHATE SERPL-MCNC: 2.9 MG/DL — SIGNIFICANT CHANGE UP (ref 2.5–4.5)
PLATELET # BLD AUTO: 572 K/UL — HIGH (ref 150–400)
POTASSIUM SERPL-MCNC: 4.3 MMOL/L — SIGNIFICANT CHANGE UP (ref 3.5–5.3)
POTASSIUM SERPL-SCNC: 4.3 MMOL/L — SIGNIFICANT CHANGE UP (ref 3.5–5.3)
PROCALCITONIN SERPL-MCNC: 0.03 NG/ML — SIGNIFICANT CHANGE UP (ref 0.02–0.1)
PROT SERPL-MCNC: 7.6 G/DL — SIGNIFICANT CHANGE UP (ref 6–8.3)
PROTHROM AB SERPL-ACNC: 13.8 SEC — HIGH (ref 10–12.9)
RBC # BLD: 4.87 M/UL — SIGNIFICANT CHANGE UP (ref 4.2–5.8)
RBC # FLD: 13.2 % — SIGNIFICANT CHANGE UP (ref 10.3–14.5)
SARS-COV-2 RNA SPEC QL NAA+PROBE: SIGNIFICANT CHANGE UP
SODIUM SERPL-SCNC: 141 MMOL/L — SIGNIFICANT CHANGE UP (ref 135–145)
TOTAL CHOLESTEROL/HDL RATIO MEASUREMENT: 5.1 RATIO — SIGNIFICANT CHANGE UP (ref 3.4–9.6)
TRIGL SERPL-MCNC: 154 MG/DL — HIGH (ref 10–149)
TSH SERPL-MCNC: 1.88 UU/ML — SIGNIFICANT CHANGE UP (ref 0.34–4.82)
WBC # BLD: 9.27 K/UL — SIGNIFICANT CHANGE UP (ref 3.8–10.5)
WBC # FLD AUTO: 9.27 K/UL — SIGNIFICANT CHANGE UP (ref 3.8–10.5)

## 2020-04-07 PROCEDURE — 99223 1ST HOSP IP/OBS HIGH 75: CPT

## 2020-04-07 RX ORDER — CHOLECALCIFEROL (VITAMIN D3) 125 MCG
1000 CAPSULE ORAL DAILY
Refills: 0 | Status: DISCONTINUED | OUTPATIENT
Start: 2020-04-07 | End: 2020-04-08

## 2020-04-07 RX ORDER — ENOXAPARIN SODIUM 100 MG/ML
40 INJECTION SUBCUTANEOUS DAILY
Refills: 0 | Status: DISCONTINUED | OUTPATIENT
Start: 2020-04-07 | End: 2020-04-08

## 2020-04-07 RX ORDER — HYDROXYCHLOROQUINE SULFATE 200 MG
200 TABLET ORAL EVERY 12 HOURS
Refills: 0 | Status: DISCONTINUED | OUTPATIENT
Start: 2020-04-08 | End: 2020-04-08

## 2020-04-07 RX ORDER — CHOLECALCIFEROL (VITAMIN D3) 125 MCG
1000 CAPSULE ORAL
Qty: 0 | Refills: 0 | DISCHARGE
Start: 2020-04-07

## 2020-04-07 RX ORDER — ACETAMINOPHEN 500 MG
650 TABLET ORAL EVERY 6 HOURS
Refills: 0 | Status: DISCONTINUED | OUTPATIENT
Start: 2020-04-07 | End: 2020-04-08

## 2020-04-07 RX ORDER — HYDROXYCHLOROQUINE SULFATE 200 MG
200 TABLET ORAL
Qty: 0 | Refills: 0 | DISCHARGE
Start: 2020-04-07

## 2020-04-07 RX ORDER — HYDROXYCHLOROQUINE SULFATE 200 MG
TABLET ORAL
Refills: 0 | Status: DISCONTINUED | OUTPATIENT
Start: 2020-04-07 | End: 2020-04-08

## 2020-04-07 RX ORDER — ASCORBIC ACID 60 MG
500 TABLET,CHEWABLE ORAL DAILY
Refills: 0 | Status: DISCONTINUED | OUTPATIENT
Start: 2020-04-07 | End: 2020-04-07

## 2020-04-07 RX ORDER — THIAMINE MONONITRATE (VIT B1) 100 MG
100 TABLET ORAL DAILY
Refills: 0 | Status: DISCONTINUED | OUTPATIENT
Start: 2020-04-07 | End: 2020-04-07

## 2020-04-07 RX ORDER — HYDROXYCHLOROQUINE SULFATE 200 MG
400 TABLET ORAL EVERY 12 HOURS
Refills: 0 | Status: COMPLETED | OUTPATIENT
Start: 2020-04-07 | End: 2020-04-07

## 2020-04-07 RX ADMIN — Medication 1000 UNIT(S): at 15:31

## 2020-04-07 RX ADMIN — Medication 40 MILLIGRAM(S): at 18:45

## 2020-04-07 RX ADMIN — Medication 400 MILLIGRAM(S): at 15:32

## 2020-04-07 RX ADMIN — ENOXAPARIN SODIUM 40 MILLIGRAM(S): 100 INJECTION SUBCUTANEOUS at 15:31

## 2020-04-07 RX ADMIN — Medication 400 MILLIGRAM(S): at 23:25

## 2020-04-07 NOTE — ACUTE INTERFACILITY TRANSFER NOTE - ENOXAPARIN/LOVENOX EDUCATION
Enoxaparin/Lovenox/ – Compliance/Enoxaparin/Lovenox – Potential for Adverse Drug Reaction and Interactions/Enoxaparin/Lovenox/ – Dietary Advice/Enoxaparin/Lovenox – Follow up Monitoring

## 2020-04-07 NOTE — ADVANCED PRACTICE NURSE CONSULT - ASSESSMENT
This is a 54yr old male patient admitted for SARS, presenting with evidence of Psoriasis to areas of the Back, Abdomen, and Chest; otherwise the patient is without pressure injury

## 2020-04-07 NOTE — H&P ADULT - NSHPPHYSICALEXAM_GEN_ALL_CORE
CONSTITUTIONAL: Well appearing, well nourished, awake, alert and in no apparent distress  ENMT: Airway patent, Nasal mucosa clear. Mouth with normal mucosa. Throat has no vesicles, no oropharyngeal exudates and uvula is midline.  EYES: Clear bilaterally, pupils equal, round and reactive to light. EOMI.  CARDIAC: Normal rate, regular rhythm.  Heart sounds S1, S2.  No murmurs, rubs or gallops   RESPIRATORY: Decreased breath sounds   MUSCULOSKELETAL: Spine appears normal, range of motion is not limited, no muscle or joint tenderness  EXTREMITIES: No edema, cyanosis or deformity   NEUROLOGICAL: Alert and oriented, no focal deficits, no motor or sensory deficits.  SKIN: No rash, skin turgor

## 2020-04-07 NOTE — ACUTE INTERFACILITY TRANSFER NOTE - PLAN OF CARE
To achieve proper SpO2 secondary to pneumonia prescribed plaquenil for pneumonia secondary to COVID19

## 2020-04-07 NOTE — H&P ADULT - PROBLEM SELECTOR PLAN 2
IMPROVE VTE score:  [ ] Previous VTE                                                3  [ ] Thrombophilia                                             2  [ ] Lower limb paralysis                                  2        (unable to hold up >15 seconds)    [ ] Current Cancer (within 6 months)            2   x[] Immobilization > 24 hrs                              1  [ ] ICU/CCU stay > 24 hours                            1  [] Age > 60                                                         1  Improve score 1, lovenox

## 2020-04-07 NOTE — H&P ADULT - HISTORY OF PRESENT ILLNESS
Patient is a 55 year old male from home with No significant  PMHx presented to ED with worsening shortness of breath. patient was admitted to [facility] on March 29'2020 and was later transferred to [place] for further care. Patient states that  he was Covid positive and was discharged from the hospital yesterday. He initially felt fine but later started having shortness of breath and called EMS. As per triage  note, patient was saturating 74% on room air, he was placed on 100% NRB. At present, patient is no respiratory distress, switched him off NRB to NC and was saturating well. Patient is a 54 year old male from home with No significant  PMHx presented to ED with worsening shortness of breath. patient was admitted to [facility] on March 29'2020 and was later transferred to [place] for further care. Patient states that  he was Covid positive and was discharged from the hospital yesterday. He initially felt fine but later started having shortness of breath and called EMS. As per triage  note, patient was saturating 74% on room air, he was placed on 100% NRB. At present, patient is no respiratory distress, switched him off NRB to NC and was saturating well.

## 2020-04-07 NOTE — H&P ADULT - ASSESSMENT
Patient is a 54 year old male from home with No significant  PMHx presented to ED with worsening shortness of breath

## 2020-04-07 NOTE — ACUTE INTERFACILITY TRANSFER NOTE - HOSPITAL COURSE
Patient is a 55 year old male from home with No significant  PMHx presented to ED with worsening shortness of breath. patient was admitted to [facility] on March 29'2020 and was later transferred to [place] for further care. Patient states that  he was Covid positive and was discharged from the hospital yesterday. He initially felt fine but later started having shortness of breath and called EMS. As per triage  note, patient was saturating 74% on room air, he was placed on 100% NRB. At present, patient is no respiratory distress, switched him off NRB to NC and was saturating well.     Started patient on Plaquenil. Patient is a 55 year old male from home with No significant  PMHx presented to ED with worsening shortness of breath. patient was admitted to [facility] on March 29'2020 and was later transferred to [place] for further care. Patient states that  he was Covid positive and was discharged from the hospital yesterday. He initially felt fine but later started having shortness of breath and called EMS. As per triage  note, patient was saturating 74% on room air, he was placed on 100% NRB. At present, patient is no respiratory distress, switched him off NRB to NC and was saturating well.     Started patient on Plaquenil. on 4/8/2020, patient was transferred to [facility] for further medical management Patient is a 54 year old male from home with No significant  PMHx presented to ED with worsening shortness of breath. patient was admitted to [facility] on March 29'2020 and was later transferred to [place] for further care. Patient states that  he was Covid positive and was discharged from the hospital yesterday. He initially felt fine but later started having shortness of breath and called EMS. As per triage  note, patient was saturating 74% on room air, he was placed on 100% NRB. At present, patient is no respiratory distress, switched him off NRB to NC and was saturating well.     Started patient on Plaquenil. on 4/8/2020, patient was transferred to [facility] for further medical management

## 2020-04-07 NOTE — H&P ADULT - ATTENDING COMMENTS
Pt seen and examined. Case discussed with MAR  Agree with HPI assessment and plan as above    54 year old man recently admitted to our service and transferred to [place] where he was managed for COVID 19 pneumonia. He was discharged yesterday but returns to the ED on account of respiratory distress.    Vital Signs Last 24 Hrs  T(C): 36.7 (07 Apr 2020 07:00), Max: 37 (06 Apr 2020 21:22)  T(F): 98 (07 Apr 2020 07:00), Max: 98.6 (06 Apr 2020 21:22)  HR: 84 (07 Apr 2020 07:00) (79 - 112)  BP: 100/60 (07 Apr 2020 07:00) (97/61 - 148/82)  RR: 20 (07 Apr 2020 07:00) (20 - 26)  SpO2: 99% (07 Apr 2020 07:00) (74% - 99%)    Middle aged man, NAD AAO X 3  CTA with decreased breath sounds bibasally  RRR S1S2 only  Soft NTND BS +  No pedal edema    Labs                        15.5   10.66 )-----------( 598      ( 06 Apr 2020 22:27 )             46.8     04-06    140  |  107  |  19<H>  ----------------------------<  118<H>  3.9   |  25  |  0.96    Ca    9.1      06 Apr 2020 22:26  TPro  8.1  /  Alb  2.9<L>  /  TBili  0.3  /  DBili  x   /  AST  64<H>  /  ALT  167<H>  /  AlkPhos  109  04-06    CXR - B/L pneumonia with LLL dense opacity    Impression  Acute respiratory failure with hypoxia  Bilateral pneumonia    Plan  Admit to Medicine with COVID 19 isolation protocol  F/up pending covid pcr and baseline acute phase reactants and serially  Pt completed course of antibiotics and Plaquenil  Supportive care .

## 2020-04-07 NOTE — H&P ADULT - PROBLEM SELECTOR PLAN 1
Patient was tested Covid + on march 31, returns with sob and hypoxia   Chest X-ray: No interval change in b/l infiltrates  Completed abx dose   Will start vitamin C , thiamine, vitamin D   C/w supplemental oxygen, switch to NC and monitor

## 2020-04-08 ENCOUNTER — INPATIENT (INPATIENT)
Facility: HOSPITAL | Age: 55
LOS: 0 days | Discharge: ANOTHER HOSPITAL | DRG: 177 | End: 2020-04-09
Attending: INTERNAL MEDICINE | Admitting: INTERNAL MEDICINE
Payer: MEDICAID

## 2020-04-08 VITALS
OXYGEN SATURATION: 100 % | RESPIRATION RATE: 18 BRPM | HEART RATE: 111 BPM | DIASTOLIC BLOOD PRESSURE: 82 MMHG | SYSTOLIC BLOOD PRESSURE: 118 MMHG | TEMPERATURE: 98 F

## 2020-04-08 DIAGNOSIS — J96.00 ACUTE RESPIRATORY FAILURE, UNSPECIFIED WHETHER WITH HYPOXIA OR HYPERCAPNIA: ICD-10-CM

## 2020-04-08 DIAGNOSIS — J96.01 ACUTE RESPIRATORY FAILURE WITH HYPOXIA: ICD-10-CM

## 2020-04-08 DIAGNOSIS — J12.89 OTHER VIRAL PNEUMONIA: ICD-10-CM

## 2020-04-08 DIAGNOSIS — U07.1 COVID-19: ICD-10-CM

## 2020-04-08 DIAGNOSIS — J12.81 PNEUMONIA DUE TO SARS-ASSOCIATED CORONAVIRUS: ICD-10-CM

## 2020-04-08 DIAGNOSIS — R19.7 DIARRHEA, UNSPECIFIED: ICD-10-CM

## 2020-04-08 DIAGNOSIS — B34.9 VIRAL INFECTION, UNSPECIFIED: ICD-10-CM

## 2020-04-08 DIAGNOSIS — Z29.9 ENCOUNTER FOR PROPHYLACTIC MEASURES, UNSPECIFIED: ICD-10-CM

## 2020-04-08 LAB
ALBUMIN SERPL ELPH-MCNC: 3.4 G/DL — SIGNIFICANT CHANGE UP (ref 3.3–5)
ALBUMIN SERPL ELPH-MCNC: 3.5 G/DL — SIGNIFICANT CHANGE UP (ref 3.3–5)
ALP SERPL-CCNC: 88 U/L — SIGNIFICANT CHANGE UP (ref 40–120)
ALP SERPL-CCNC: 91 U/L — SIGNIFICANT CHANGE UP (ref 40–120)
ALT FLD-CCNC: 103 U/L — HIGH (ref 10–45)
ALT FLD-CCNC: 87 U/L — HIGH (ref 10–45)
ANION GAP SERPL CALC-SCNC: 13 MMOL/L — SIGNIFICANT CHANGE UP (ref 5–17)
ANION GAP SERPL CALC-SCNC: 15 MMOL/L — SIGNIFICANT CHANGE UP (ref 5–17)
APTT BLD: 33.2 SEC — SIGNIFICANT CHANGE UP (ref 27.5–36.3)
AST SERPL-CCNC: 33 U/L — SIGNIFICANT CHANGE UP (ref 10–40)
AST SERPL-CCNC: 40 U/L — SIGNIFICANT CHANGE UP (ref 10–40)
BASE EXCESS BLDV CALC-SCNC: -1.9 MMOL/L — SIGNIFICANT CHANGE UP
BASOPHILS # BLD AUTO: 0.02 K/UL — SIGNIFICANT CHANGE UP (ref 0–0.2)
BASOPHILS # BLD AUTO: 0.03 K/UL — SIGNIFICANT CHANGE UP (ref 0–0.2)
BASOPHILS NFR BLD AUTO: 0.2 % — SIGNIFICANT CHANGE UP (ref 0–2)
BASOPHILS NFR BLD AUTO: 0.3 % — SIGNIFICANT CHANGE UP (ref 0–2)
BILIRUB SERPL-MCNC: 0.4 MG/DL — SIGNIFICANT CHANGE UP (ref 0.2–1.2)
BILIRUB SERPL-MCNC: 0.4 MG/DL — SIGNIFICANT CHANGE UP (ref 0.2–1.2)
BUN SERPL-MCNC: 18 MG/DL — SIGNIFICANT CHANGE UP (ref 7–23)
BUN SERPL-MCNC: 21 MG/DL — SIGNIFICANT CHANGE UP (ref 7–23)
CA-I SERPL-SCNC: 1.25 MMOL/L — SIGNIFICANT CHANGE UP (ref 1.12–1.3)
CALCIUM SERPL-MCNC: 9.2 MG/DL — SIGNIFICANT CHANGE UP (ref 8.4–10.5)
CALCIUM SERPL-MCNC: 9.6 MG/DL — SIGNIFICANT CHANGE UP (ref 8.4–10.5)
CHLORIDE SERPL-SCNC: 104 MMOL/L — SIGNIFICANT CHANGE UP (ref 96–108)
CHLORIDE SERPL-SCNC: 106 MMOL/L — SIGNIFICANT CHANGE UP (ref 96–108)
CK SERPL-CCNC: 66 U/L — SIGNIFICANT CHANGE UP (ref 30–200)
CO2 SERPL-SCNC: 20 MMOL/L — LOW (ref 22–31)
CO2 SERPL-SCNC: 22 MMOL/L — SIGNIFICANT CHANGE UP (ref 22–31)
CREAT SERPL-MCNC: 0.72 MG/DL — SIGNIFICANT CHANGE UP (ref 0.5–1.3)
CREAT SERPL-MCNC: 0.77 MG/DL — SIGNIFICANT CHANGE UP (ref 0.5–1.3)
CRP SERPL-MCNC: 0.79 MG/DL — HIGH (ref 0–0.4)
CRP SERPL-MCNC: 0.97 MG/DL — HIGH (ref 0–0.4)
D DIMER BLD IA.RAPID-MCNC: 176 NG/ML DDU — SIGNIFICANT CHANGE UP
D DIMER BLD IA.RAPID-MCNC: 199 NG/ML DDU — SIGNIFICANT CHANGE UP
EOSINOPHIL # BLD AUTO: 0 K/UL — SIGNIFICANT CHANGE UP (ref 0–0.5)
EOSINOPHIL # BLD AUTO: 0 K/UL — SIGNIFICANT CHANGE UP (ref 0–0.5)
EOSINOPHIL NFR BLD AUTO: 0 % — SIGNIFICANT CHANGE UP (ref 0–6)
EOSINOPHIL NFR BLD AUTO: 0 % — SIGNIFICANT CHANGE UP (ref 0–6)
FERRITIN SERPL-MCNC: 591 NG/ML — HIGH (ref 30–400)
FERRITIN SERPL-MCNC: 689 NG/ML — HIGH (ref 30–400)
FIBRINOGEN PPP-MCNC: 449 MG/DL — HIGH (ref 258–438)
GAS PNL BLDV: 136 MMOL/L — LOW (ref 138–146)
GAS PNL BLDV: SIGNIFICANT CHANGE UP
GLUCOSE SERPL-MCNC: 155 MG/DL — HIGH (ref 70–99)
GLUCOSE SERPL-MCNC: 170 MG/DL — HIGH (ref 70–99)
HCO3 BLDV-SCNC: 23 MMOL/L — SIGNIFICANT CHANGE UP (ref 20–27)
HCT VFR BLD CALC: 41.5 % — SIGNIFICANT CHANGE UP (ref 39–50)
HCT VFR BLD CALC: 42.2 % — SIGNIFICANT CHANGE UP (ref 39–50)
HCV AB S/CO SERPL IA: 0.2 S/CO — SIGNIFICANT CHANGE UP
HCV AB SERPL-IMP: SIGNIFICANT CHANGE UP
HGB BLD-MCNC: 13.5 G/DL — SIGNIFICANT CHANGE UP (ref 13–17)
HGB BLD-MCNC: 13.9 G/DL — SIGNIFICANT CHANGE UP (ref 13–17)
IMM GRANULOCYTES NFR BLD AUTO: 1.3 % — SIGNIFICANT CHANGE UP (ref 0–1.5)
IMM GRANULOCYTES NFR BLD AUTO: 1.7 % — HIGH (ref 0–1.5)
INR BLD: 1.16 — SIGNIFICANT CHANGE UP (ref 0.88–1.16)
LACTATE SERPL-SCNC: 2.7 MMOL/L — HIGH (ref 0.5–2)
LDH SERPL L TO P-CCNC: 289 U/L — HIGH (ref 50–242)
LYMPHOCYTES # BLD AUTO: 0.79 K/UL — LOW (ref 1–3.3)
LYMPHOCYTES # BLD AUTO: 0.86 K/UL — LOW (ref 1–3.3)
LYMPHOCYTES # BLD AUTO: 7.7 % — LOW (ref 13–44)
LYMPHOCYTES # BLD AUTO: 8 % — LOW (ref 13–44)
MAGNESIUM SERPL-MCNC: 2.3 MG/DL — SIGNIFICANT CHANGE UP (ref 1.6–2.6)
MCHC RBC-ENTMCNC: 29.6 PG — SIGNIFICANT CHANGE UP (ref 27–34)
MCHC RBC-ENTMCNC: 29.9 PG — SIGNIFICANT CHANGE UP (ref 27–34)
MCHC RBC-ENTMCNC: 32.5 GM/DL — SIGNIFICANT CHANGE UP (ref 32–36)
MCHC RBC-ENTMCNC: 32.9 GM/DL — SIGNIFICANT CHANGE UP (ref 32–36)
MCV RBC AUTO: 90 FL — SIGNIFICANT CHANGE UP (ref 80–100)
MCV RBC AUTO: 92 FL — SIGNIFICANT CHANGE UP (ref 80–100)
MONOCYTES # BLD AUTO: 0.1 K/UL — SIGNIFICANT CHANGE UP (ref 0–0.9)
MONOCYTES # BLD AUTO: 0.38 K/UL — SIGNIFICANT CHANGE UP (ref 0–0.9)
MONOCYTES NFR BLD AUTO: 1 % — LOW (ref 2–14)
MONOCYTES NFR BLD AUTO: 3.5 % — SIGNIFICANT CHANGE UP (ref 2–14)
NEUTROPHILS # BLD AUTO: 9.11 K/UL — HIGH (ref 1.8–7.4)
NEUTROPHILS # BLD AUTO: 9.41 K/UL — HIGH (ref 1.8–7.4)
NEUTROPHILS NFR BLD AUTO: 87 % — HIGH (ref 43–77)
NEUTROPHILS NFR BLD AUTO: 89.3 % — HIGH (ref 43–77)
NRBC # BLD: 0 /100 WBCS — SIGNIFICANT CHANGE UP (ref 0–0)
NRBC # BLD: 0 /100 WBCS — SIGNIFICANT CHANGE UP (ref 0–0)
NT-PROBNP SERPL-SCNC: 20 PG/ML — SIGNIFICANT CHANGE UP (ref 0–300)
PCO2 BLDV: 38 MMHG — LOW (ref 41–51)
PH BLDV: 7.39 — SIGNIFICANT CHANGE UP (ref 7.32–7.43)
PHOSPHATE SERPL-MCNC: 3.3 MG/DL — SIGNIFICANT CHANGE UP (ref 2.5–4.5)
PLATELET # BLD AUTO: 518 K/UL — HIGH (ref 150–400)
PLATELET # BLD AUTO: 530 K/UL — HIGH (ref 150–400)
PO2 BLDV: 84 MMHG — SIGNIFICANT CHANGE UP
POTASSIUM BLDV-SCNC: 4.5 MMOL/L — SIGNIFICANT CHANGE UP (ref 3.5–4.9)
POTASSIUM SERPL-MCNC: 5.1 MMOL/L — SIGNIFICANT CHANGE UP (ref 3.5–5.3)
POTASSIUM SERPL-MCNC: 5.5 MMOL/L — HIGH (ref 3.5–5.3)
POTASSIUM SERPL-SCNC: 5.1 MMOL/L — SIGNIFICANT CHANGE UP (ref 3.5–5.3)
POTASSIUM SERPL-SCNC: 5.5 MMOL/L — HIGH (ref 3.5–5.3)
PROT SERPL-MCNC: 7.1 G/DL — SIGNIFICANT CHANGE UP (ref 6–8.3)
PROT SERPL-MCNC: 7.6 G/DL — SIGNIFICANT CHANGE UP (ref 6–8.3)
PROTHROM AB SERPL-ACNC: 13.3 SEC — HIGH (ref 10–12.9)
RBC # BLD: 4.51 M/UL — SIGNIFICANT CHANGE UP (ref 4.2–5.8)
RBC # BLD: 4.69 M/UL — SIGNIFICANT CHANGE UP (ref 4.2–5.8)
RBC # FLD: 13.2 % — SIGNIFICANT CHANGE UP (ref 10.3–14.5)
RBC # FLD: 13.2 % — SIGNIFICANT CHANGE UP (ref 10.3–14.5)
SAO2 % BLDV: 96 % — SIGNIFICANT CHANGE UP
SARS-COV-2 RNA SPEC QL NAA+PROBE: SIGNIFICANT CHANGE UP
SODIUM SERPL-SCNC: 139 MMOL/L — SIGNIFICANT CHANGE UP (ref 135–145)
SODIUM SERPL-SCNC: 141 MMOL/L — SIGNIFICANT CHANGE UP (ref 135–145)
TROPONIN T SERPL-MCNC: <0.01 NG/ML — SIGNIFICANT CHANGE UP (ref 0–0.01)
WBC # BLD: 10.2 K/UL — SIGNIFICANT CHANGE UP (ref 3.8–10.5)
WBC # BLD: 10.81 K/UL — HIGH (ref 3.8–10.5)
WBC # FLD AUTO: 10.2 K/UL — SIGNIFICANT CHANGE UP (ref 3.8–10.5)
WBC # FLD AUTO: 10.81 K/UL — HIGH (ref 3.8–10.5)

## 2020-04-08 PROCEDURE — 93005 ELECTROCARDIOGRAM TRACING: CPT

## 2020-04-08 PROCEDURE — 99053 MED SERV 10PM-8AM 24 HR FAC: CPT

## 2020-04-08 PROCEDURE — 99285 EMERGENCY DEPT VISIT HI MDM: CPT | Mod: 25

## 2020-04-08 PROCEDURE — 36415 COLL VENOUS BLD VENIPUNCTURE: CPT

## 2020-04-08 PROCEDURE — 85027 COMPLETE CBC AUTOMATED: CPT

## 2020-04-08 PROCEDURE — 83036 HEMOGLOBIN GLYCOSYLATED A1C: CPT

## 2020-04-08 PROCEDURE — 84443 ASSAY THYROID STIM HORMONE: CPT

## 2020-04-08 PROCEDURE — 71045 X-RAY EXAM CHEST 1 VIEW: CPT | Mod: 26

## 2020-04-08 PROCEDURE — 85652 RBC SED RATE AUTOMATED: CPT

## 2020-04-08 PROCEDURE — 84100 ASSAY OF PHOSPHORUS: CPT

## 2020-04-08 PROCEDURE — 83735 ASSAY OF MAGNESIUM: CPT

## 2020-04-08 PROCEDURE — 71045 X-RAY EXAM CHEST 1 VIEW: CPT

## 2020-04-08 PROCEDURE — 85379 FIBRIN DEGRADATION QUANT: CPT

## 2020-04-08 PROCEDURE — 80061 LIPID PANEL: CPT

## 2020-04-08 PROCEDURE — 87635 SARS-COV-2 COVID-19 AMP PRB: CPT

## 2020-04-08 PROCEDURE — 85610 PROTHROMBIN TIME: CPT

## 2020-04-08 PROCEDURE — 93010 ELECTROCARDIOGRAM REPORT: CPT

## 2020-04-08 PROCEDURE — 80053 COMPREHEN METABOLIC PANEL: CPT

## 2020-04-08 PROCEDURE — 86140 C-REACTIVE PROTEIN: CPT

## 2020-04-08 PROCEDURE — 84145 PROCALCITONIN (PCT): CPT

## 2020-04-08 PROCEDURE — 82728 ASSAY OF FERRITIN: CPT

## 2020-04-08 PROCEDURE — 83615 LACTATE (LD) (LDH) ENZYME: CPT

## 2020-04-08 PROCEDURE — 99238 HOSP IP/OBS DSCHRG MGMT 30/<: CPT | Mod: GC

## 2020-04-08 RX ORDER — HYDROXYCHLOROQUINE SULFATE 200 MG
200 TABLET ORAL EVERY 12 HOURS
Refills: 0 | Status: DISCONTINUED | OUTPATIENT
Start: 2020-04-08 | End: 2020-04-09

## 2020-04-08 RX ORDER — ACETAMINOPHEN 500 MG
2 TABLET ORAL
Qty: 0 | Refills: 0 | DISCHARGE
Start: 2020-04-08

## 2020-04-08 RX ORDER — ENOXAPARIN SODIUM 100 MG/ML
40 INJECTION SUBCUTANEOUS DAILY
Refills: 0 | Status: DISCONTINUED | OUTPATIENT
Start: 2020-04-08 | End: 2020-04-09

## 2020-04-08 RX ORDER — SODIUM POLYSTYRENE SULFONATE 4.1 MEQ/G
15 POWDER, FOR SUSPENSION ORAL ONCE
Refills: 0 | Status: COMPLETED | OUTPATIENT
Start: 2020-04-08 | End: 2020-04-08

## 2020-04-08 RX ORDER — ACETAMINOPHEN 500 MG
650 TABLET ORAL EVERY 6 HOURS
Refills: 0 | Status: DISCONTINUED | OUTPATIENT
Start: 2020-04-08 | End: 2020-04-09

## 2020-04-08 RX ORDER — FAMOTIDINE 10 MG/ML
20 INJECTION INTRAVENOUS DAILY
Refills: 0 | Status: DISCONTINUED | OUTPATIENT
Start: 2020-04-08 | End: 2020-04-09

## 2020-04-08 RX ORDER — HYDROXYCHLOROQUINE SULFATE 200 MG
1 TABLET ORAL
Qty: 9 | Refills: 0
Start: 2020-04-08 | End: 2020-04-12

## 2020-04-08 RX ADMIN — SODIUM POLYSTYRENE SULFONATE 15 GRAM(S): 4.1 POWDER, FOR SUSPENSION ORAL at 14:46

## 2020-04-08 RX ADMIN — Medication 200 MILLIGRAM(S): at 06:51

## 2020-04-08 RX ADMIN — FAMOTIDINE 20 MILLIGRAM(S): 10 INJECTION INTRAVENOUS at 14:46

## 2020-04-08 RX ADMIN — ENOXAPARIN SODIUM 40 MILLIGRAM(S): 100 INJECTION SUBCUTANEOUS at 14:46

## 2020-04-08 RX ADMIN — Medication 200 MILLIGRAM(S): at 17:45

## 2020-04-08 NOTE — ED PROVIDER NOTE - CLINICAL SUMMARY MEDICAL DECISION MAKING FREE TEXT BOX
55 year old male, otherwise healthy, transferred from [facility] for admission for hypoxia associated with Covid 19 pneumonia. Patient was admitted to [facility] on March 29'2020 and was later transferred to [place] for further care. Patient states that he was Covid positive and was discharged from the hospital on March 5th. He initially felt fine but later started having shortness of breath with syncope and called EMS and was taken to [facility] ED. As per triage  note, patient was saturating 74% on room air, he was placed on 100% NRB. He was admitted there and then transferred to [facility] for admission. At present, patient is no respiratory distress, switched him off NRB to NC and was saturating well.    ED course: 55 year old male, otherwise healthy, transferred from [facility] for admission for hypoxia associated with Covid 19 pneumonia. Patient was admitted to [facility] on March 29'2020 and was later transferred to [place] for further care. Patient states that he was Covid positive and was discharged from the hospital on March 5th. He initially felt fine but later started having shortness of breath with syncope and called EMS and was taken to [facility] ED. As per triage  note, patient was saturating 74% on room air, he was placed on 100% NRB. He was admitted there and then transferred to [facility] for admission. At present, patient is no respiratory distress, switched him off NRB to NC and was saturating well.    ED course: labs/ studies noted. CXR with b/l opacities. EKG noted. Pt comfortable on NC and in no acute distress. Admitted for Covid 19 pneumonia and associate hypoxia.

## 2020-04-08 NOTE — ED ADULT NURSE NOTE - OBJECTIVE STATEMENT
55 y/o male received into the ED, axox3, stating that he has been admitted to [facility] for the past 10 days for having a +COVID test and was sent home where he passed out.  Pt. states that his wife took him back to [facility] where he was admitted once again for having low oxygen levels.  Pt. states that he no longer feels like he suffers from fevers but is still having SOB.

## 2020-04-08 NOTE — PROGRESS NOTE ADULT - SUBJECTIVE AND OBJECTIVE BOX
HD #2  Patient is 55 yo man diagnosed with COVID 19 3/29/20 ans was admitted to [facility].  Was dicsharged and then readmitted several days later with SOB and hypoexemia (O2 Sat 74%). Details of those admissions lacking.  Home on 4/5.  D/c again only to develop same sx's that led to readmit to [facility] last night.  SOB and < O2 Sat led to NRM Rx on admission.  Since them has improved and placed on nasal cannula O2 (has reasonabale Sats).  CXR yesterday shows bilateral infiltrates.    Restarted on hydroxychlorquine and azithromycin (plus lovenox prophylaxis).    Lives with wife and daughter.    Feels better today on floor.  Ambulating to the  (mild SOB) and had BM today.  Oral intake is ok.      PSH: epigastric upper midline hernia repair    PMH:  psoriasis  otherwise fairly healthy    Vital Signs Last 24 Hrs  T(C): 36.7 (08 Apr 2020 10:12), Max: 36.7 (08 Apr 2020 02:40)  T(F): 98 (08 Apr 2020 10:12), Max: 98.1 (08 Apr 2020 03:49)  HR: 92 (08 Apr 2020 10:12) (89 - 111)  BP: 107/69 (08 Apr 2020 10:12) (96/64 - 124/80)  BP(mean): --  RR: 18 (08 Apr 2020 10:12) (16 - 18)  SpO2: 96% (08 Apr 2020 10:12) (95% - 100%)  On 2 L nasal O2 with O2 Sat 91%.  He is comfortable     lungs clear bilaterally  cor: S1S2  abd: healed midline vertical scar in epigastrium, psoriatic lesion LLQ, BS+, not distended and non tender  ext: distal pulses intact, without calf tenderness    voiding freely    Labs             13.5   10.81 )-----------( 518      ( 08 Apr 2020 06:37 )             41.5   04-08    141  |  106  |  21  ----------------------------<  155<H>  5.5<H>   |  22  |  0.77    Ca    9.2      08 Apr 2020 06:37  Phos  3.3     04-08  Mg     2.3     04-08    TPro  7.1  /  Alb  3.4  /  TBili  0.4  /  DBili  x   /  AST  33  /  ALT  87<H>  /  AlkPhos  91  04-08      Markers:  D dimer 176 (199 on admit)  ferritin 591 (689)  CRP 0.79 (0.97)    ALT/SGPT 87

## 2020-04-08 NOTE — H&P ADULT - ASSESSMENT
This is a 54 year old male with no pmhx who originally presented to the [place] ED with complaints of worsening shortness of breath.  Patient was originally admitted to [facility] on 03/29/2020 and later transferred to unknown facility in [place] for further care.  Patient has one COVID+ PCR on 03/31/2020 in [facility].  Patient states he was discharged from facility in [place] on 04/05/2020.  Patient initially felt fine, however on 04/07/2020 patient started feeling much worse, prompting him to call EMS.  Upon arrival to [facility] ED, his SpO2 was 74% on room air, improved via 100% non-rebreather mask.  Patient was subsequently weaned off NRB to 3LNC with SpO2 of 95%.  Due to lack of COVID beds available, patient transferred to [facility] ED for placement on regional COVID unit for further management.

## 2020-04-08 NOTE — PROGRESS NOTE ADULT - SUBJECTIVE AND OBJECTIVE BOX
COVID [facility] PROGRESS NOTE-    SUBJECTIVE: Pt seen and examined at bedside this am. Patient denies acute events overnight. He was satting 95% on 3L nasal cannula and when transitioned to RA he desaturated to 86%. He has been restarted on NC at 2L and is satting 95%. Patient denies subjective shortness of breath, even when he desaturated to 86%. Denies N/V/D.    VITAL SIGNS:  ICU Vital Signs Last 24 Hrs  T(C): 36.7 (08 Apr 2020 10:12), Max: 36.7 (08 Apr 2020 02:40)  T(F): 98 (08 Apr 2020 10:12), Max: 98.1 (08 Apr 2020 03:49)  HR: 92 (08 Apr 2020 10:12) (89 - 111)  BP: 107/69 (08 Apr 2020 10:12) (96/64 - 124/80)  BP(mean): --  ABP: --  ABP(mean): --  RR: 18 (08 Apr 2020 10:12) (16 - 18)  SpO2: 96% (08 Apr 2020 10:12) (95% - 100%)      CAPILLARY BLOOD GLUCOSE      PHYSICAL EXAM:  General: NAD  HEENT: NC/AT; PERRL, clear conjunctiva  Neck: supple  Respiratory: CTA b/l  Cardiovascular: +S1/S2; RRR  Abdomen: soft, NT/ND; +BS x4  Extremities: WWP, 2+ peripheral pulses b/l; no LE edema  Skin: normal color and turgor; no rash  Neurological: A&O x 3    MEDICATIONS:  MEDICATIONS  (STANDING):  enoxaparin Injectable 40 milliGRAM(s) SubCutaneous daily  famotidine    Tablet 20 milliGRAM(s) Oral daily  hydroxychloroquine 200 milliGRAM(s) Oral every 12 hours    MEDICATIONS  (PRN):  acetaminophen   Tablet .. 650 milliGRAM(s) Oral every 6 hours PRN Temp greater or equal to 38C (100.4F)      LABS:                        13.5   10.81 )-----------( 518      ( 08 Apr 2020 06:37 )             41.5     04-08    141  |  106  |  21  ----------------------------<  155<H>  5.5<H>   |  22  |  0.77    Ca    9.2      08 Apr 2020 06:37  Phos  3.3     04-08  Mg     2.3     04-08    TPro  7.1  /  Alb  3.4  /  TBili  0.4  /  DBili  x   /  AST  33  /  ALT  87<H>  /  AlkPhos  91  04-08    PT/INR - ( 08 Apr 2020 01:38 )   PT: 13.3 sec;   INR: 1.16          PTT - ( 08 Apr 2020 01:38 )  PTT:33.2 sec    Procalcitonin:   D-dimer: D-Dimer Assay, Quantitative: 176 ng/mL DDU (04-08-20 @ 06:37)  D-Dimer Assay, Quantitative: 199 ng/mL DDU (04-08-20 @ 01:38)    ESR:   CRP: C-Reactive Protein, Serum: 0.79 mg/dL (04-08-20 @ 06:37)  C-Reactive Protein, Serum: 0.97 mg/dL (04-08-20 @ 01:38)    LDH: Lactate Dehydrogenase, Serum: 289 U/L (04-08-20 @ 01:38)    Ferritin: Ferritin, Serum: 591 ng/mL (04-08-20 @ 06:37)  Ferritin, Serum: 689 ng/mL (04-08-20 @ 01:38)    Lactate: Lactate, Blood: 2.7 mmol/L (04-08-20 @ 01:38)  lc  Trop I: Troponin T, Serum: <0.01 ng/mL (04-08-20 @ 01:38)    Ck: Creatine Kinase, Serum: 66 U/L (04-08-20 @ 01:38)    Full T cell subset:   G6PD:   Immunoglobulins panel:   Quantiferon Gold Tb:   Triglyceride level:         Culture - Blood (collected 04-08-20 @ 03:52)  Source: .Blood Blood-Peripheral  Preliminary Report (04-08-20 @ 16:01):    No growth at 12 hours    Culture - Blood (collected 04-08-20 @ 03:52)  Source: .Blood Blood-Peripheral  Preliminary Report (04-08-20 @ 16:01):    No growth at 12 hours COVID [facility] PROGRESS NOTE-    SUBJECTIVE: Patient was seen and examined at bedside this am. Patient denies acute events overnight. Patient denies subjective shortness of breath, even when he desaturated to 86%. Denies N/V/D.    VITAL SIGNS:  ICU Vital Signs Last 24 Hrs  T(C): 36.7 (08 Apr 2020 10:12), Max: 36.7 (08 Apr 2020 02:40)  T(F): 98 (08 Apr 2020 10:12), Max: 98.1 (08 Apr 2020 03:49)  HR: 92 (08 Apr 2020 10:12) (89 - 111)  BP: 107/69 (08 Apr 2020 10:12) (96/64 - 124/80)  BP(mean): --  ABP: --  ABP(mean): --  RR: 18 (08 Apr 2020 10:12) (16 - 18)  SpO2: 96% (08 Apr 2020 10:12) (95% - 100%)      CAPILLARY BLOOD GLUCOSE      PHYSICAL EXAM:  General: NAD  HEENT: NC/AT; PERRL, clear conjunctiva  Neck: supple  Respiratory: CTA b/l  Cardiovascular: +S1/S2; RRR  Abdomen: soft, NT/ND; +BS x4  Extremities: WWP, 2+ peripheral pulses b/l; no LE edema  Skin: normal color and turgor; no rash  Neurological: A&O x 3    MEDICATIONS:  MEDICATIONS  (STANDING):  enoxaparin Injectable 40 milliGRAM(s) SubCutaneous daily  famotidine    Tablet 20 milliGRAM(s) Oral daily  hydroxychloroquine 200 milliGRAM(s) Oral every 12 hours    MEDICATIONS  (PRN):  acetaminophen   Tablet .. 650 milliGRAM(s) Oral every 6 hours PRN Temp greater or equal to 38C (100.4F)      LABS:                        13.5   10.81 )-----------( 518      ( 08 Apr 2020 06:37 )             41.5     04-08    141  |  106  |  21  ----------------------------<  155<H>  5.5<H>   |  22  |  0.77    Ca    9.2      08 Apr 2020 06:37  Phos  3.3     04-08  Mg     2.3     04-08    TPro  7.1  /  Alb  3.4  /  TBili  0.4  /  DBili  x   /  AST  33  /  ALT  87<H>  /  AlkPhos  91  04-08    PT/INR - ( 08 Apr 2020 01:38 )   PT: 13.3 sec;   INR: 1.16          PTT - ( 08 Apr 2020 01:38 )  PTT:33.2 sec    Procalcitonin:   D-dimer: D-Dimer Assay, Quantitative: 176 ng/mL DDU (04-08-20 @ 06:37)  D-Dimer Assay, Quantitative: 199 ng/mL DDU (04-08-20 @ 01:38)    ESR:   CRP: C-Reactive Protein, Serum: 0.79 mg/dL (04-08-20 @ 06:37)  C-Reactive Protein, Serum: 0.97 mg/dL (04-08-20 @ 01:38)    LDH: Lactate Dehydrogenase, Serum: 289 U/L (04-08-20 @ 01:38)    Ferritin: Ferritin, Serum: 591 ng/mL (04-08-20 @ 06:37)  Ferritin, Serum: 689 ng/mL (04-08-20 @ 01:38)    Lactate: Lactate, Blood: 2.7 mmol/L (04-08-20 @ 01:38)  lc  Trop I: Troponin T, Serum: <0.01 ng/mL (04-08-20 @ 01:38)    Ck: Creatine Kinase, Serum: 66 U/L (04-08-20 @ 01:38)    Full T cell subset:   G6PD:   Immunoglobulins panel:   Quantiferon Gold Tb:   Triglyceride level:         Culture - Blood (collected 04-08-20 @ 03:52)  Source: .Blood Blood-Peripheral  Preliminary Report (04-08-20 @ 16:01):    No growth at 12 hours    Culture - Blood (collected 04-08-20 @ 03:52)  Source: .Blood Blood-Peripheral  Preliminary Report (04-08-20 @ 16:01):    No growth at 12 hours

## 2020-04-08 NOTE — ED PROVIDER NOTE - CHIEF COMPLAINT
The patient is a 54y Male complaining of The patient is a 54y Male complaining of shortness of breath

## 2020-04-08 NOTE — ED PROVIDER NOTE - OBJECTIVE STATEMENT
55 year old male from home with No significant  PMHx presented to ED with worsening shortness of breath. patient was admitted to [facility] on March 29'2020 and was later transferred to [place] for further care. Patient states that  he was Covid positive and was discharged from the hospital yesterday. He initially felt fine but later started having shortness of breath and called EMS. As per triage  note, patient was saturating 74% on room air, he was placed on 100% NRB. At present, patient is no respiratory distress, switched him off NRB to NC and was saturating well. 55 year old male from home with No significant  PMHx presented to ED with worsening shortness of breath. patient was admitted to [facility] on March 29'2020 and was later transferred to [place] for further care. Patient states that  he was Covid positive and was discharged from the march 5th. He initially felt fine but later started having shortness of breath and called EMS. As per triage  note, patient was saturating 74% on room air, he was placed on 100% NRB. At present, patient is no respiratory distress, switched him off NRB to NC and was saturating well. 55 year old male from home with No significant  PMHx presented to ED with worsening shortness of breath. patient was admitted to [facility] on March 29'2020 and was later transferred to [place] for further care. Patient states that  he was Covid positive and was discharged from the March 5th. He initially felt fine but later started having shortness of breath and called EMS. As per triage  note, patient was saturating 74% on room air, he was placed on 100% NRB. At present, patient is no respiratory distress, switched him off NRB to NC and was saturating well. 55 year old male, otherwise healthy, transferred from [facility] for admission for hypoxia associated with Covid 19 pneumonia. MHx presented to ED with worsening shortness of breath. patient was admitted to [facility] on March 29'2020 and was later transferred to [place] for further care. Patient states that  he was Covid positive and was discharged from the March 5th. He initially felt fine but later started having shortness of breath and called EMS. As per triage  note, patient was saturating 74% on room air, he was placed on 100% NRB. At present, patient is no respiratory distress, switched him off NRB to NC and was saturating well. 55 year old male, otherwise healthy, transferred from [facility] for admission for hypoxia associated with Covid 19 pneumonia. Patient was admitted to [facility] on March 29'2020 and was later transferred to [place] for further care. Patient states that he was Covid positive and was discharged from the hospital on March 5th. He initially felt fine but later started having shortness of breath with syncope and called EMS and was taken to [facility] ED. As per triage  note, patient was saturating 74% on room air, he was placed on 100% NRB. He was admitted there and then transferred to [facility] for admission. At present, patient is no respiratory distress, switched him off NRB to NC and was saturating well.

## 2020-04-08 NOTE — PROGRESS NOTE ADULT - PROBLEM SELECTOR PLAN 2
Patient with 1x + COVID19 result on 03/31/2020, Neut% 89.3, Lymph% 7.7, Ferritin 689, Lactate 2.7, CRP 0.97, AST 40, , Chest x-ray with B/L infiltrates, with wedge-shaped opacity in mid-L side.  - Isolation precautions; contact and isolation  - F/u covid-19 PCR (1 x positive result 03/31/2020, negative here in [facility] via PCR)  - Monitor O2 saturation, monitor for respiratory distress  - Nasal cannula as needed, avoid HFNC/BiPAP  - Tylenol 650mg for fever   - Continue hydroxyxhloroquine 200mg BID for 4 days (already received loading doses at [facility]).  - S/P azithromycin 250mg QD x 5 days  - F/u D-Dimer, Fibrinogen, Haptoglobin, Procalcitonin, Ferritin, ESR/CRP, LDH, CK  - F/u Quantiferon and G6PD Diet: Regular  GI: Pepcid  DVT: Lovenox    Dispo: [facility]

## 2020-04-08 NOTE — DISCHARGE NOTE PROVIDER - NSDCMRMEDTOKEN_GEN_ALL_CORE_FT
acetaminophen 325 mg oral tablet: 2 tab(s) orally every 6 hours, As needed, Temp greater or equal to 38C (100.4F)  hydroxychloroquine 200 mg oral tablet: 1 tab(s) orally every 12 hours

## 2020-04-08 NOTE — DISCHARGE NOTE PROVIDER - HOSPITAL COURSE
#Discharge: do not delete        Patient is __ yo M/F with past medical history of _____    Presented with _____, found to have _____    Problem List/Main Diagnoses (system-based):         Inpatient treatment course:         New medications:     Labs to be followed outpatient:     Exam to be followed outpatient: #Discharge: do not delete        Patient is 53yo M with no past medical history    Presented with SOB, found to have COVID-19 PNA    Problem List/Main Diagnoses (system-based):     Respiratory failure, acute.  Plan: SpO2 75% on [facility], improved now to 95% on 3LNC in [facility] ED.  Chest x-ray with B/L infiltrates, with wedge-shaped opacity in mid-L side.    - Patient with 1x + COVID19 result on 03/31/2020, see below for plan for COVID19    - Patient with lactate of 2.9    - blood cultures sent, NGTD    - Supplemental O2, now on RA            Pneumonia due to SARS-associated coronavirus.  Plan: Patient with 1x + COVID19 result on 03/31/2020, Neut% 89.3, Lymph% 7.7, Ferritin 689, Lactate 2.7, CRP 0.97, AST 40, , Chest x-ray with B/L infiltrates, with wedge-shaped opacity in mid-L side.    - Isolation precautions; contact and isolation    - F/u covid-19 PCR (1 x positive result 03/31/2020, negative here in [facility] via PCR)    - Monitor O2 saturation, monitor for respiratory distress    - Nasal cannula as needed, avoid HFNC/BiPAP    - Tylenol 650mg for fever     - Continue hydroxyxhloroquine 200mg BID for 4 days ([facility]    - S/P azithromycin 250mg QD x 5 days        Inpatient treatment course:     54 year old male with no pmhx who originally presented to the [place] ED with complaints of worsening shortness of breath.  Patient was originally admitted to [facility] on 03/29/2020 and later transferred to unknown facility in [place] for further care.  Patient has one COVID+ PCR on 03/31/2020 in [place] Chart (see record number below).  Patient states he was discharged from facility in [place] on 04/05/2020.  Patient initially felt fine, however on 04/07/2020 patient started feeling much worse, prompting him to call EMS.  Upon arrival to [facility] ED, his SpO2 was 74% on room air, improved via 100% non-rebreather mask.  Patient was subsequently weaned off NRB to 3LNC with SpO2 of 95%.  Due to lack of COVID beds available, patient transferred to [facility] ED for placement on regional COVID unit for further management.        New medications:     Labs to be followed outpatient:     Exam to be followed outpatient: #Discharge: do not delete        Patient is 55yo M with no past medical history    Presented with SOB, found to have COVID-19 PNA    Problem List/Main Diagnoses (system-based):     Respiratory failure, acute.  Plan: SpO2 75% on room air upon arrival to [facility] ED, improved now to 95% on 3LNC in [facility] ED.  Chest x-ray with B/L infiltrates, with wedge-shaped opacity in mid-L side.    - Patient with 1x + COVID19 result on 03/31/2020, see below for plan for COVID19    - Patient with lactate of 2.9    - blood cultures sent, NGTD    - Supplemental O2, now on RA            Pneumonia due to SARS-associated coronavirus.  Plan: Patient with 1x + COVID19 result on 03/31/2020, Neut% 89.3, Lymph% 7.7, Ferritin 689, Lactate 2.7, CRP 0.97, AST 40, , Chest x-ray with B/L infiltrates, with wedge-shaped opacity in mid-L side.    - Isolation precautions; contact and isolation    - F/u covid-19 PCR (1 x positive result 03/31/2020, negative here in [facility] via PCR)    - Monitor O2 saturation, monitor for respiratory distress    - Nasal cannula as needed, avoid HFNC/BiPAP    - Tylenol 650mg for fever     - Continue hydroxyxhloroquine 200mg BID for 4 days (already received loading doses at [facility]).    - S/P azithromycin 250mg QD x 5 days        Inpatient treatment course:     54 year old male with no pmhx who originally presented to the [place] ED with complaints of worsening shortness of breath.  Patient was originally admitted to [facility] on 03/29/2020 and later transferred to unknown facility in [place] for further care.  Patient has one COVID+ PCR on 03/31/2020 in [place] Chart (see record number below).  Patient states he was discharged from facility in [place] on 04/05/2020.  Patient initially felt fine, however on 04/07/2020 patient started feeling much worse, prompting him to call EMS.  Upon arrival to [facility] ED, his SpO2 was 74% on room air, improved via 100% non-rebreather mask.  Patient was subsequently weaned off NRB to 3LNC with SpO2 of 95%.  Due to lack of COVID beds available, patient transferred to [facility] ED for placement on regional COVID unit for further management. Patient continued to receive plaquenil in patient, and was ultimately transitioned from nasal cannula 2 LPM to room air. However, when patient exerts himself, he desats to mid 80s, and was placed on 2-4LPM nasal cannula. Given this, patient is ambulatory and a candidate for transfer to discharge to [facility].        New medications: Hydroxychloroquine 200 mg for 3 more doses    Labs to be followed outpatient: None    Exam to be followed outpatient: None

## 2020-04-08 NOTE — H&P ADULT - HISTORY OF PRESENT ILLNESS
This is a 54 year old male with no pmhx who originally presented to the [place] ED with complaints of worsening shortness of breath.  Patient was originally admitted to [facility] on 03/29/2020 and later transferred to unknown facility in [place] for further care.  Patient has one COVID+ PCR on 03/31/2020 in [place] Chart (see record number below).  Patient states he was discharged from facility in [place] on 04/05/2020.  Patient initially felt fine, however on 04/07/2020 patient started feeling much worse, prompting him to call EMS.  Upon arrival to [facility] ED, his SpO2 was 74% on room air, improved via 100% non-rebreather mask.  Patient was subsequently weaned off NRB to 3LNC with SpO2 of 95%.  Due to lack of COVID beds available, patient transferred to [facility] ED for placement on regional COVID unit for further management.    Other records available in [place] MR# [id number], pending merge with current chart.    Date of onset of fever: 03/22/2020  Date of onset of dyspnea: 03/22/2020  Recent Travel: no  Sick Contacts: unknown  COVID Exposure: unknown  Close Contacts: none

## 2020-04-08 NOTE — ED ADULT NURSE NOTE - NSIMPLEMENTINTERV_GEN_ALL_ED
Implemented All Universal Safety Interventions:  [place] to call system. Call bell, personal items and telephone within reach. Instruct patient to call for assistance. Room bathroom lighting operational. Non-slip footwear when patient is off stretcher. Physically safe environment: no spills, clutter or unnecessary equipment. Stretcher in lowest position, wheels locked, appropriate side rails in place.

## 2020-04-08 NOTE — PROGRESS NOTE ADULT - PROBLEM SELECTOR PLAN 3
SpO2 75% on room air upon arrival to [facility] ED, improved now to 95% on 3LNC in [facility] ED.  Chest x-ray with B/L infiltrates, with wedge-shaped opacity in mid-L side.  - Patient with 1x + COVID19 result on 03/31/2020, see below for plan for COVID19  - Patient with lactate of 2.9  - blood cultures sent, F/U result  - Supplemental O2  - Consider 1g Ceftriaxone IVPB

## 2020-04-08 NOTE — H&P ADULT - PROBLEM SELECTOR PLAN 2
Patient with 1x + COVID19 result on 03/31/2020, Neut% 89.3, Lymph% 7.7, Ferritin 689, Lactate 2.7, CRP 0.97, AST 40, , Chest x-ray with B/L infiltrates, with wedge-shaped opacity in mid-L side.  - Isolation precautions; contact and isolation  - F/u covid-19 PCR (1 x positive result 03/31/2020, negative here in [facility] via PCR)  - Monitor O2 saturation, monitor for respiratory distress  - Nasal cannula as needed, avoid HFNC/BiPAP  - Tylenol 650mg for fever   - Continue hydroxyxhloroquine 200mg BID for 4 days (already received loading doses at [facility]).  - S/P azithromycin 250mg QD x 5 days  - F/u D-Dimer, Fibrinogen, Haptoglobin, Procalcitonin, Ferritin, ESR/CRP, LDH, CK  - F/u Quantiferon and G6PD

## 2020-04-08 NOTE — H&P ADULT - NSHPPHYSICALEXAM_GEN_ALL_CORE
Vital Signs Last 24 Hrs  T(C): 36.7 (08 Apr 2020 03:49), Max: 36.7 (08 Apr 2020 02:40)  T(F): 98.1 (08 Apr 2020 03:49), Max: 98.1 (08 Apr 2020 03:49)  HR: 91 (08 Apr 2020 03:49) (91 - 111)  BP: 100/65 (08 Apr 2020 03:49) (100/65 - 124/80)  BP(mean): --  RR: 16 (08 Apr 2020 03:49) (16 - 18)  SpO2: 97% (08 Apr 2020 03:49) (95% - 100%)

## 2020-04-08 NOTE — DISCHARGE NOTE PROVIDER - NSDCCPCAREPLAN_GEN_ALL_CORE_FT
PRINCIPAL DISCHARGE DIAGNOSIS  Diagnosis: COVID-19 virus infection  Assessment and Plan of Treatment: You were diagnosed with the new COVID-19 coronavirus infection via a nasal swab. The treatment for this infection is supportive care, which includes: rest, maintaining adequate oral intake of food and water, and taking acetaminophen/tylenol for fever. Please maintain a strict home quarantine for 14 days at home, and wear a surgical mask at all times when you need to be in close proximity with another human being. Take tylenol as needed, every 6 hours, with a maximum daily dose of 4,000 mg a day. Please visit your nearest urgent care or emergency department should you start to experience: severe shortness of breath, severe cough/wheezing/difficulty breathing, or fever >103 for 3 days. Please maintain a good healthy diet. If you have any questions, please call your primary care provider.

## 2020-04-08 NOTE — ED ADULT TRIAGE NOTE - ARRIVAL INFO ADDITIONAL COMMENTS
pt sent from [facility] with covid + diagnosis 2 weeks ago and a negative test now.  arrives on 3 liters o2 by NC.  aaox3.  no sob. pt sent from [facility] with covid.  pt was covid + 2 weeks ago at [facility] and transferred to [facility] for care.  pt was discharged sunday and went home and had a syncopal episode and returned to [facility] sunday night, was readmitted and transferred here.  pt arrives on 3 liter nc aaox3 without sob.

## 2020-04-08 NOTE — PROGRESS NOTE ADULT - ASSESSMENT
This is a 54 year old male with no pmhx who originally presented to the [place] ED with complaints of worsening shortness of breath.  Patient was originally admitted to [facility] on 03/29/2020 and later transferred to unknown facility in [place] for further care.  Patient has one COVID+ PCR on 03/31/2020 in [facility].  Patient states he was discharged from facility in [place] on 04/05/2020.  Patient initially felt fine, however on 04/07/2020 patient started feeling much worse, prompting him to call EMS.  Upon arrival to [facility] ED, his SpO2 was 74% on room air, improved via 100% non-rebreather mask.  Patient was subsequently weaned off NRB to 3LNC with SpO2 of 95%.  Due to lack of COVID beds available, patient transferred to [facility] ED for placement on regional COVID unit for further management.

## 2020-04-08 NOTE — PROGRESS NOTE ADULT - PROBLEM SELECTOR PLAN 1
Presented with worsening shortness of breath since 3/29, now improved; Satting 95% on 2L NC   -COVID + resulted on 3/31, contact droplet isolation  -inflammatory markers trending down CRP: .79,  D-dimer: 176, ferritin: 591  -QTC on admission 406  -Patient completed 5 days of Azithromycin 250mg PO QD during previous admission  -Continue Hydroxychloroquine 400mg BID x2 doses -> 200mg BID x 4 days (started 4/8/20)  -Continue Tylenol 650mg Q6 PRN for fevers  -Continue to monitor daily CBC/CMP/Mg/PO4/CRP/Ferritin/D-Dimer  -Monitor O2 saturation, monitor for respiratory distress; NC as needed, avoid HFNC/BiPAP

## 2020-04-08 NOTE — PROGRESS NOTE ADULT - ASSESSMENT
A/P  Second readmit.  Still not over the initial episode.  COVID + patient with bilateral pneumonia/infiltrate on yesterdays CXR. . Overall 2 week hx.  This is second readmit.  Markers trending down and weren't horribly elevated to start.  On plaquenil and azithromycin again.  May need other antibiotic as well for PNA.  Still needs 2 L Nasal O2.  Not ready for d/c.  Will observe.  Encourage careful ambulation and chest PT.    Discussed with SREE's and PA's.

## 2020-04-09 VITALS — TEMPERATURE: 102 F

## 2020-04-09 PROBLEM — E66.9 OBESITY, UNSPECIFIED: Chronic | Status: ACTIVE | Noted: 2020-04-07

## 2020-04-09 LAB
ALBUMIN SERPL ELPH-MCNC: 3.4 G/DL — SIGNIFICANT CHANGE UP (ref 3.3–5)
ALP SERPL-CCNC: 85 U/L — SIGNIFICANT CHANGE UP (ref 40–120)
ALT FLD-CCNC: 106 U/L — HIGH (ref 10–45)
ANION GAP SERPL CALC-SCNC: 8 MMOL/L — SIGNIFICANT CHANGE UP (ref 5–17)
AST SERPL-CCNC: 46 U/L — HIGH (ref 10–40)
BASOPHILS # BLD AUTO: 0.05 K/UL — SIGNIFICANT CHANGE UP (ref 0–0.2)
BASOPHILS NFR BLD AUTO: 0.6 % — SIGNIFICANT CHANGE UP (ref 0–2)
BILIRUB SERPL-MCNC: 0.4 MG/DL — SIGNIFICANT CHANGE UP (ref 0.2–1.2)
BUN SERPL-MCNC: 24 MG/DL — HIGH (ref 7–23)
CALCIUM SERPL-MCNC: 9.1 MG/DL — SIGNIFICANT CHANGE UP (ref 8.4–10.5)
CHLORIDE SERPL-SCNC: 105 MMOL/L — SIGNIFICANT CHANGE UP (ref 96–108)
CO2 SERPL-SCNC: 27 MMOL/L — SIGNIFICANT CHANGE UP (ref 22–31)
CREAT SERPL-MCNC: 0.96 MG/DL — SIGNIFICANT CHANGE UP (ref 0.5–1.3)
CRP SERPL-MCNC: 0.36 MG/DL — SIGNIFICANT CHANGE UP (ref 0–0.4)
D DIMER BLD IA.RAPID-MCNC: 187 NG/ML DDU — SIGNIFICANT CHANGE UP
EOSINOPHIL # BLD AUTO: 0.11 K/UL — SIGNIFICANT CHANGE UP (ref 0–0.5)
EOSINOPHIL NFR BLD AUTO: 1.3 % — SIGNIFICANT CHANGE UP (ref 0–6)
FERRITIN SERPL-MCNC: 601 NG/ML — HIGH (ref 30–400)
GAMMA INTERFERON BACKGROUND BLD IA-ACNC: 0.01 IU/ML — SIGNIFICANT CHANGE UP
GLUCOSE SERPL-MCNC: 83 MG/DL — SIGNIFICANT CHANGE UP (ref 70–99)
HCT VFR BLD CALC: 45.1 % — SIGNIFICANT CHANGE UP (ref 39–50)
HGB BLD-MCNC: 14.3 G/DL — SIGNIFICANT CHANGE UP (ref 13–17)
IMM GRANULOCYTES NFR BLD AUTO: 1.6 % — HIGH (ref 0–1.5)
LYMPHOCYTES # BLD AUTO: 2.26 K/UL — SIGNIFICANT CHANGE UP (ref 1–3.3)
LYMPHOCYTES # BLD AUTO: 25.9 % — SIGNIFICANT CHANGE UP (ref 13–44)
M TB IFN-G BLD-IMP: NEGATIVE — SIGNIFICANT CHANGE UP
M TB IFN-G CD4+ BCKGRND COR BLD-ACNC: 0 IU/ML — SIGNIFICANT CHANGE UP
M TB IFN-G CD4+CD8+ BCKGRND COR BLD-ACNC: 0 IU/ML — SIGNIFICANT CHANGE UP
MAGNESIUM SERPL-MCNC: 2.3 MG/DL — SIGNIFICANT CHANGE UP (ref 1.6–2.6)
MCHC RBC-ENTMCNC: 30 PG — SIGNIFICANT CHANGE UP (ref 27–34)
MCHC RBC-ENTMCNC: 31.7 GM/DL — LOW (ref 32–36)
MCV RBC AUTO: 94.5 FL — SIGNIFICANT CHANGE UP (ref 80–100)
MONOCYTES # BLD AUTO: 0.95 K/UL — HIGH (ref 0–0.9)
MONOCYTES NFR BLD AUTO: 10.9 % — SIGNIFICANT CHANGE UP (ref 2–14)
NEUTROPHILS # BLD AUTO: 5.2 K/UL — SIGNIFICANT CHANGE UP (ref 1.8–7.4)
NEUTROPHILS NFR BLD AUTO: 59.7 % — SIGNIFICANT CHANGE UP (ref 43–77)
NRBC # BLD: 0 /100 WBCS — SIGNIFICANT CHANGE UP (ref 0–0)
PHOSPHATE SERPL-MCNC: 3.7 MG/DL — SIGNIFICANT CHANGE UP (ref 2.5–4.5)
PLATELET # BLD AUTO: 477 K/UL — HIGH (ref 150–400)
POTASSIUM SERPL-MCNC: 4.8 MMOL/L — SIGNIFICANT CHANGE UP (ref 3.5–5.3)
POTASSIUM SERPL-SCNC: 4.8 MMOL/L — SIGNIFICANT CHANGE UP (ref 3.5–5.3)
PROT SERPL-MCNC: 6.7 G/DL — SIGNIFICANT CHANGE UP (ref 6–8.3)
QUANT TB PLUS MITOGEN MINUS NIL: 0.63 IU/ML — SIGNIFICANT CHANGE UP
RBC # BLD: 4.77 M/UL — SIGNIFICANT CHANGE UP (ref 4.2–5.8)
RBC # FLD: 13.3 % — SIGNIFICANT CHANGE UP (ref 10.3–14.5)
SODIUM SERPL-SCNC: 140 MMOL/L — SIGNIFICANT CHANGE UP (ref 135–145)
WBC # BLD: 8.71 K/UL — SIGNIFICANT CHANGE UP (ref 3.8–10.5)
WBC # FLD AUTO: 8.71 K/UL — SIGNIFICANT CHANGE UP (ref 3.8–10.5)

## 2020-04-09 PROCEDURE — 82330 ASSAY OF CALCIUM: CPT

## 2020-04-09 PROCEDURE — 85730 THROMBOPLASTIN TIME PARTIAL: CPT

## 2020-04-09 PROCEDURE — 99285 EMERGENCY DEPT VISIT HI MDM: CPT

## 2020-04-09 PROCEDURE — 85025 COMPLETE CBC W/AUTO DIFF WBC: CPT

## 2020-04-09 PROCEDURE — 36415 COLL VENOUS BLD VENIPUNCTURE: CPT

## 2020-04-09 PROCEDURE — 83880 ASSAY OF NATRIURETIC PEPTIDE: CPT

## 2020-04-09 PROCEDURE — 87635 SARS-COV-2 COVID-19 AMP PRB: CPT

## 2020-04-09 PROCEDURE — 82728 ASSAY OF FERRITIN: CPT

## 2020-04-09 PROCEDURE — 80053 COMPREHEN METABOLIC PANEL: CPT

## 2020-04-09 PROCEDURE — 71045 X-RAY EXAM CHEST 1 VIEW: CPT

## 2020-04-09 PROCEDURE — 86140 C-REACTIVE PROTEIN: CPT

## 2020-04-09 PROCEDURE — 85610 PROTHROMBIN TIME: CPT

## 2020-04-09 PROCEDURE — 83605 ASSAY OF LACTIC ACID: CPT

## 2020-04-09 PROCEDURE — 83735 ASSAY OF MAGNESIUM: CPT

## 2020-04-09 PROCEDURE — 84132 ASSAY OF SERUM POTASSIUM: CPT

## 2020-04-09 PROCEDURE — 84484 ASSAY OF TROPONIN QUANT: CPT

## 2020-04-09 PROCEDURE — 82955 ASSAY OF G6PD ENZYME: CPT

## 2020-04-09 PROCEDURE — 82803 BLOOD GASES ANY COMBINATION: CPT

## 2020-04-09 PROCEDURE — 93005 ELECTROCARDIOGRAM TRACING: CPT

## 2020-04-09 PROCEDURE — 82550 ASSAY OF CK (CPK): CPT

## 2020-04-09 PROCEDURE — 85379 FIBRIN DEGRADATION QUANT: CPT

## 2020-04-09 PROCEDURE — 86480 TB TEST CELL IMMUN MEASURE: CPT

## 2020-04-09 PROCEDURE — 85384 FIBRINOGEN ACTIVITY: CPT

## 2020-04-09 PROCEDURE — 83615 LACTATE (LD) (LDH) ENZYME: CPT

## 2020-04-09 PROCEDURE — 86803 HEPATITIS C AB TEST: CPT

## 2020-04-09 PROCEDURE — 84295 ASSAY OF SERUM SODIUM: CPT

## 2020-04-09 PROCEDURE — 84100 ASSAY OF PHOSPHORUS: CPT

## 2020-04-09 PROCEDURE — 87040 BLOOD CULTURE FOR BACTERIA: CPT

## 2020-04-09 RX ORDER — HYDROXYCHLOROQUINE SULFATE 200 MG
1 TABLET ORAL
Qty: 3 | Refills: 0
Start: 2020-04-09 | End: 2020-04-10

## 2020-04-09 RX ORDER — HYDROXYCHLOROQUINE SULFATE 200 MG
1 TABLET ORAL
Qty: 5 | Refills: 0
Start: 2020-04-09 | End: 2020-04-11

## 2020-04-09 RX ADMIN — Medication 200 MILLIGRAM(S): at 17:03

## 2020-04-09 RX ADMIN — ENOXAPARIN SODIUM 40 MILLIGRAM(S): 100 INJECTION SUBCUTANEOUS at 12:53

## 2020-04-09 RX ADMIN — FAMOTIDINE 20 MILLIGRAM(S): 10 INJECTION INTRAVENOUS at 12:53

## 2020-04-09 RX ADMIN — Medication 200 MILLIGRAM(S): at 05:41

## 2020-04-09 NOTE — PROGRESS NOTE ADULT - SUBJECTIVE AND OBJECTIVE BOX
HD 2-3  On 3 [address] readmit for COVID 10  Getting plaquenil  Not getting antibiotics  On nasal O2 2-6 L/minute  Ambulatory in room.  Using BR.  Voiding ok.    At 10:40 AM:  patient was on RA with Sat 90-91 at rest in bed.  Upon sitting up and then with standing Sat dropped to 85-86 range (patient a little SOB)  Placed back on NC O2 at 2 L rate, 5 minutes later, O2 sat 89-90  Placed on 4 L nasal O2 --> Sat 89-90, then 86-88 range    Eating Ok with BM's.  No abdominal pain.  Has had diarrhea.    Vital Signs Last 24 Hrs  T(C): 37.2 (09 Apr 2020 10:27), Max: 37.2 (09 Apr 2020 10:27)  T(F): 99 (09 Apr 2020 10:27), Max: 99 (09 Apr 2020 10:27)  HR: 85 (09 Apr 2020 10:27) (85 - 92)  BP: 104/71 (09 Apr 2020 10:27) (104/71 - 112/80)  BP(mean): --  RR: 18 (09 Apr 2020 10:27) (18 - 20)  SpO2: 92% (09 Apr 2020 10:27) (92% - 94%)    lungs clear, no rhales noted (coughs with deep breathing)  cor S1S2  abd benign, non tender  ext:  without calf tenderness                          14.3   8.71  )-----------( 477      ( 09 Apr 2020 07:50 )             45.1   04-09    140  |  105  |  24<H>  ----------------------------<  83  4.8   |  27  |  0.96    Ca    9.1      09 Apr 2020 07:50  Phos  3.7     04-09  Mg     2.3     04-09    TPro  6.7  /  Alb  3.4  /  TBili  0.4  /  DBili  x   /  AST  46<H>  /  ALT  106<H>  /  AlkPhos  85  04-09    D dimers 187 (176)  Ferritin 601 (591)  CRP  0.36 (0.79)

## 2020-04-09 NOTE — PROGRESS NOTE ADULT - ASSESSMENT
A/P Still requiring NC O2 in 2-6 L range.  Sats in 85-90 range while on RA, and 2 L O2  Now, needs O2 rate increased to 6 L/min  Otherwise well  Markers about the same and CRP WNL  If can get buy on 6 L NC O2 or less then may be transferred to [facility] or [facility] to continue.  No antibiotics (finished course earlier).  Continue increased ambulation, deep breathing.

## 2020-04-09 NOTE — DISCHARGE NOTE NURSING/CASE MANAGEMENT/SOCIAL WORK - PATIENT PORTAL LINK FT
You can access the FollowMyHealth Patient Portal offered by [facility] by registering at the following website: [email or web address]. By joining [employer]’s FollowMyHealth portal, you will also be able to view your health information using other applications (apps) compatible with our system.

## 2020-04-10 LAB — G6PD RBC-CCNC: 14.7 U/G HGB — SIGNIFICANT CHANGE UP (ref 7–20.5)

## 2020-04-12 LAB
CULTURE RESULTS: NO GROWTH — SIGNIFICANT CHANGE UP
CULTURE RESULTS: NO GROWTH — SIGNIFICANT CHANGE UP
SPECIMEN SOURCE: SIGNIFICANT CHANGE UP
SPECIMEN SOURCE: SIGNIFICANT CHANGE UP

## 2022-01-24 PROBLEM — Z78.9 OTHER SPECIFIED HEALTH STATUS: Chronic | Status: INACTIVE | Noted: 2020-03-29 | Resolved: 2020-04-07

## 2023-05-20 ENCOUNTER — EMERGENCY (EMERGENCY)
Facility: HOSPITAL | Age: 58
LOS: 1 days | Discharge: HOME | End: 2023-05-20
Admitting: STUDENT IN AN ORGANIZED HEALTH CARE EDUCATION/TRAINING PROGRAM
Payer: MEDICAID

## 2023-05-20 VITALS
OXYGEN SATURATION: 95 % | SYSTOLIC BLOOD PRESSURE: 153 MMHG | HEIGHT: 65 IN | DIASTOLIC BLOOD PRESSURE: 98 MMHG | HEART RATE: 73 BPM | WEIGHT: 220.02 LBS | RESPIRATION RATE: 18 BRPM | TEMPERATURE: 98 F

## 2023-05-20 PROCEDURE — 99283 EMERGENCY DEPT VISIT LOW MDM: CPT

## 2023-05-20 PROCEDURE — 99282 EMERGENCY DEPT VISIT SF MDM: CPT

## 2023-05-20 RX ORDER — SODIUM CHLORIDE 0.65 %
2 AEROSOL, SPRAY (ML) NASAL
Qty: 1 | Refills: 0
Start: 2023-05-20 | End: 2023-05-26

## 2023-05-20 RX ORDER — MUPIROCIN 20 MG/G
1 OINTMENT TOPICAL ONCE
Refills: 0 | Status: DISCONTINUED | OUTPATIENT
Start: 2023-05-20 | End: 2023-05-24

## 2023-05-20 NOTE — ED ADULT NURSE NOTE - OBJECTIVE STATEMENT
Pt arrived because he has been having bloody nose episodes over the past five days only during the night time. Blood out of nose is reportedly a moderate amount and stops after a couple minutes of holding but pt reports difficulty breathing when bloody episodes are occurring. Pt denies  cp, sob, n, v, dizziness, lightheadedness, acute pain. Pt breathing even and unlabored. No active breathing at this time. Pt NAD.

## 2023-05-20 NOTE — ED ADULT TRIAGE NOTE - HISTORY OF COVID-19 VACCINATION
LUMBAR SPINE WO CONTRAST

 

History: Reason: RADICULOPATHY/BACK PAIN / Spl. Instructions: Lumbar 

epidural, no surgical hx. / History: Alternating, worsening BLE pain in 

recent months.

 

Technique: Multiplanar, multi sequential MR imaging was performed of the 

lumbar spine.

 

Comparison: July 9, 2015

 

Findings: 

New grade 1 anterolisthesis L4 on L5. Normal vertebral body height. No 

fracture. 

 

Conus terminates at the normal location. No evidence of nerve root 

clumping.

 

Left renal cyst.

 

L1-L2:  No canal or neuroforaminal narrowing. Mild facet arthropathy.

 

L2-L3:  No canal or neuroforaminal narrowing. Mild facet arthropathy.

 

L3-L4:  Small disc bulge. Moderate facet arthropathy. Mild left subacute 

recess narrowing. No canal narrowing. Right foraminal small disc 

protrusion with annular fissure. Mild right neuroforaminal narrowing. No 

left neuroforaminal narrowing.

 

L4-L5:  Broad-based disc bulge. Advanced facet arthropathy. Grade 1 

anterolisthesis. Mild to moderate canal narrowing. Severe subarticular 

recess narrowing. Abutment of descending L5 nerve roots. Mild bilateral 

neuroforaminal narrowing.

 

L5-S1:  Small disc bulge. Advanced facet arthropathy. No canal narrowing. 

Minimal neuroforaminal narrowing.

 

The degenerative findings are progressed compared to 2015.

 

Impression: 

1.  Multilevel lumbar spondylosis most prominent L4-L5, progressed 

compared to 2015.

2.  Grade 1 anterolisthesis L4 on L5 due to advanced facet arthropathy 

contributing to mild to moderate canal narrowing with severe subarticular 

recess narrowing. Correlate for radiculopathy.

 

Electronically signed by: Arcadio Banegas DO (11/3/2020 1:04 PM) [facility]EN[facility] No

## 2023-05-20 NOTE — ED ADULT TRIAGE NOTE - CHIEF COMPLAINT QUOTE
To ED c/o Epistaxis to b/o nostril today. Not bleeding this time. Denies trauma/ blood thinners. Reports intermittent Epistaxis over last week.

## 2023-05-20 NOTE — ED PROVIDER NOTE - CLINICAL SUMMARY MEDICAL DECISION MAKING FREE TEXT BOX
Pt afebrile and hemodynamically stable. No active epistaxis. Pt has dried blood in bilateral nares. Likely related to dry mucous membranes. Feels improved after mupirocin application in ED and sent rx for nasal saline to pharmacy. Will f/u with pmd if symptoms fail to improve. Return precautions given.

## 2023-05-20 NOTE — ED PROVIDER NOTE - PHYSICAL EXAMINATION
VITAL SIGNS: I have reviewed nursing notes and confirm.  CONSTITUTIONAL: Well-developed; in no acute distress.   SKIN:  warm and dry, no acute rash.   HEAD:  normocephalic, atraumatic.  EYES: PERRL, EOM intact; conjunctiva and sclera clear.  ENT: Dried blood in bilateral nares, no active bleeding. Airway patent. Managing secretions appropriately.   NECK: Supple; non tender.  CARD: S1, S2 normal; no murmurs, gallops, or rubs. Regular rate and rhythm.   RESP:  Clear to auscultation b/l, no wheezes, rales or rhonchi.  ABD: Normal bowel sounds; soft; non-distended; non-tender; no guarding/ rebound.  EXT: Normal ROM. No clubbing, cyanosis or edema. 2+ pulses to b/l ue/le.  NEURO: Alert, oriented, grossly unremarkable  PSYCH: Cooperative, mood and affect appropriate.

## 2023-05-20 NOTE — ED ADULT NURSE NOTE - NSFALLUNIVINTERV_ED_ALL_ED
Bed/Stretcher in lowest position, wheels locked, appropriate side rails in place/Call bell, personal items and telephone in reach/Instruct patient to call for assistance before getting out of bed/chair/stretcher/Non-slip footwear applied when patient is off stretcher/[place] to call system/Physically safe environment - no spills, clutter or unnecessary equipment/Purposeful proactive rounding/Room/bathroom lighting operational, light cord in reach

## 2023-05-20 NOTE — ED PROVIDER NOTE - NS ED ROS FT
Constitutional: No fever. No chills.  Eyes: No redness. No discharge. No vision change.   ENT: No sore throat. No ear pain. +epistaxis.  Cardiovascular: No chest pain. No leg swelling.  Respiratory: No cough. No shortness of breath.  GI: No abdominal pain. No vomiting. No diarrhea.   MSK: No joint pain. No back pain.   Skin: No rash. No abrasions.   Neuro: No numbness. No weakness.   Psych: No known mental health issues.

## 2023-05-20 NOTE — ED PROVIDER NOTE - NSFOLLOWUPINSTRUCTIONS_ED_ALL_ED_FT
Use la crema dos veces cada price en ambos lados.    Use las gotas cuatro veces cada price para hidrar la nariz.    Ludmila kalie phyllis con spicer doctor de cabazera si heather sintomas no se mejoren.

## 2023-05-20 NOTE — ED PROVIDER NOTE - PATIENT PORTAL LINK FT
You can access the FollowMyHealth Patient Portal offered by [facility] by registering at the following website: [email or web address]. By joining [employer]’s FollowMyHealth portal, you will also be able to view your health information using other applications (apps) compatible with our system.

## 2023-05-20 NOTE — ED PROVIDER NOTE - OBJECTIVE STATEMENT
56yo male with no reported pmhx presents with bleeding from bilateral nostrils x7 days. Pt reports intermittent bleeding, states this occurs mainly at night after he has coughed or blown his nose. States bleeding will last approximately 2 minutes and resolve spontaneously. He denies trauma or injury to nose. He denies history of epistaxis or bleeding disorder. He is not on anticoagulation. He denies fever, chills, sore throat, cough, chest pain, shortness of breath.

## 2023-05-21 PROBLEM — Z86.19 PERSONAL HISTORY OF OTHER INFECTIOUS AND PARASITIC DISEASES: Chronic | Status: ACTIVE | Noted: 2020-04-08

## 2023-05-22 DIAGNOSIS — R04.0 EPISTAXIS: ICD-10-CM

## 2023-05-22 DIAGNOSIS — Z86.16 PERSONAL HISTORY OF COVID-19: ICD-10-CM

## 2024-04-26 NOTE — ED ADULT NURSE NOTE - NSFALLRSKPASTHIST_ED_ALL_ED
79 year old female with a PMH of ESRD s/p renal transplant, HTN, HLD, anemia, dementia, and recent cva approx 2 months ago presented to the ED from [facility] for GLO. found to have hypothermia and hypotensive on presentation, along with acute severe anemia, Elevated INR of 12, Thrombocytopenia, Lactic/metabolic acidosis. she was admitted to MICU for shock needing vasopressors, ivf , 3 units prbcs, FFP and Plt transfusion. shock resolved and she was downgraded to medicine service. source of her anemia was thought to be GI bleed but no overt GI bleeding noticed. Hb remained stable and up trending. GI attempted to do EGD/colo on 04/26/24 but patient had not finished her bowel prep so procedure was canceled and in light of her stable Hb it was decided not to pursue this any further. her acute renal failure has continued to improve with IV fluids. she was on Coumadin 5 mg as an outpatient for "clot in arm and stroke" as per son. patient did not know why she was on coumadin. it was decided to stop anticoagulation for now. she is being discharged to [facility] now. 79 year old female with a PMH of ESRD s/p renal transplant, HTN, HLD, anemia, dementia, and recent cva approx 2 months ago presented to the ED from [facility] for GLO. found to have hypothermia and hypotensive on presentation, along with acute severe anemia, Elevated INR of 12, Thrombocytopenia, Lactic/metabolic acidosis. she was admitted to MICU for shock needing vasopressors, ivf , 3 units prbcs, FFP and Plt transfusion. shock resolved and she was downgraded to medicine service. source of her anemia was thought to be GI bleed but no overt GI bleeding noticed. Hb remained stable and up trending. GI attempted to do EGD/colo on 04/26/24 but patient had not finished her bowel prep so procedure was canceled and in light of her stable Hb it was decided not to pursue this any further. her acute renal failure has continued to improve with IV fluids. she was on Coumadin 5 mg as an outpatient for UE DVT, d/w son, no plan for resuming AC.     Patient also noted with lung lesions, family declined biopsy. Wants to followup with heme/onc outpatient.   She is now stable for discharge to [facility].      no

## 2024-12-25 ENCOUNTER — INPATIENT (INPATIENT)
Facility: HOSPITAL | Age: 59
LOS: 4 days | Discharge: HOME | DRG: 726 | End: 2024-12-30
Attending: INTERNAL MEDICINE | Admitting: INTERNAL MEDICINE
Payer: COMMERCIAL

## 2024-12-25 VITALS
SYSTOLIC BLOOD PRESSURE: 132 MMHG | HEART RATE: 78 BPM | RESPIRATION RATE: 18 BRPM | TEMPERATURE: 99 F | HEIGHT: 65 IN | OXYGEN SATURATION: 95 % | DIASTOLIC BLOOD PRESSURE: 85 MMHG | WEIGHT: 218.7 LBS

## 2024-12-25 DIAGNOSIS — L40.9 PSORIASIS, UNSPECIFIED: ICD-10-CM

## 2024-12-25 DIAGNOSIS — R35.89 OTHER POLYURIA: ICD-10-CM

## 2024-12-25 DIAGNOSIS — Z29.9 ENCOUNTER FOR PROPHYLACTIC MEASURES, UNSPECIFIED: ICD-10-CM

## 2024-12-25 DIAGNOSIS — R33.9 RETENTION OF URINE, UNSPECIFIED: ICD-10-CM

## 2024-12-25 LAB
ALBUMIN SERPL ELPH-MCNC: 3.4 G/DL — LOW (ref 3.5–5)
ALP SERPL-CCNC: 92 U/L — SIGNIFICANT CHANGE UP (ref 40–120)
ALT FLD-CCNC: 23 U/L DA — SIGNIFICANT CHANGE UP (ref 10–60)
ANION GAP SERPL CALC-SCNC: 7 MMOL/L — SIGNIFICANT CHANGE UP (ref 5–17)
APPEARANCE UR: CLEAR — SIGNIFICANT CHANGE UP
AST SERPL-CCNC: 23 U/L — SIGNIFICANT CHANGE UP (ref 10–40)
BASOPHILS # BLD AUTO: 0.03 K/UL — SIGNIFICANT CHANGE UP (ref 0–0.2)
BASOPHILS NFR BLD AUTO: 0.2 % — SIGNIFICANT CHANGE UP (ref 0–2)
BILIRUB SERPL-MCNC: 0.4 MG/DL — SIGNIFICANT CHANGE UP (ref 0.2–1.2)
BILIRUB UR-MCNC: NEGATIVE — SIGNIFICANT CHANGE UP
BUN SERPL-MCNC: 13 MG/DL — SIGNIFICANT CHANGE UP (ref 7–18)
CALCIUM SERPL-MCNC: 8.3 MG/DL — LOW (ref 8.4–10.5)
CHLORIDE SERPL-SCNC: 99 MMOL/L — SIGNIFICANT CHANGE UP (ref 96–108)
CO2 SERPL-SCNC: 26 MMOL/L — SIGNIFICANT CHANGE UP (ref 22–31)
COLOR SPEC: YELLOW — SIGNIFICANT CHANGE UP
CREAT SERPL-MCNC: 0.84 MG/DL — SIGNIFICANT CHANGE UP (ref 0.5–1.3)
DIFF PNL FLD: ABNORMAL
EGFR: 100 ML/MIN/1.73M2 — SIGNIFICANT CHANGE UP
EOSINOPHIL # BLD AUTO: 0 K/UL — SIGNIFICANT CHANGE UP (ref 0–0.5)
EOSINOPHIL NFR BLD AUTO: 0 % — SIGNIFICANT CHANGE UP (ref 0–6)
GLUCOSE SERPL-MCNC: 127 MG/DL — HIGH (ref 70–99)
GLUCOSE UR QL: NEGATIVE MG/DL — SIGNIFICANT CHANGE UP
HCT VFR BLD CALC: 38.4 % — LOW (ref 39–50)
HGB BLD-MCNC: 13.4 G/DL — SIGNIFICANT CHANGE UP (ref 13–17)
IMM GRANULOCYTES NFR BLD AUTO: 0.5 % — SIGNIFICANT CHANGE UP (ref 0–0.9)
KETONES UR-MCNC: NEGATIVE MG/DL — SIGNIFICANT CHANGE UP
LEUKOCYTE ESTERASE UR-ACNC: NEGATIVE — SIGNIFICANT CHANGE UP
LYMPHOCYTES # BLD AUTO: 0.67 K/UL — LOW (ref 1–3.3)
LYMPHOCYTES # BLD AUTO: 5.2 % — LOW (ref 13–44)
MCHC RBC-ENTMCNC: 30.7 PG — SIGNIFICANT CHANGE UP (ref 27–34)
MCHC RBC-ENTMCNC: 34.9 G/DL — SIGNIFICANT CHANGE UP (ref 32–36)
MCV RBC AUTO: 87.9 FL — SIGNIFICANT CHANGE UP (ref 80–100)
MONOCYTES # BLD AUTO: 0.92 K/UL — HIGH (ref 0–0.9)
MONOCYTES NFR BLD AUTO: 7.2 % — SIGNIFICANT CHANGE UP (ref 2–14)
NEUTROPHILS # BLD AUTO: 11.16 K/UL — HIGH (ref 1.8–7.4)
NEUTROPHILS NFR BLD AUTO: 86.9 % — HIGH (ref 43–77)
NITRITE UR-MCNC: NEGATIVE — SIGNIFICANT CHANGE UP
NRBC # BLD: 0 /100 WBCS — SIGNIFICANT CHANGE UP (ref 0–0)
PH UR: 6 — SIGNIFICANT CHANGE UP (ref 5–8)
PLATELET # BLD AUTO: 203 K/UL — SIGNIFICANT CHANGE UP (ref 150–400)
POTASSIUM SERPL-MCNC: 3.1 MMOL/L — LOW (ref 3.5–5.3)
POTASSIUM SERPL-SCNC: 3.1 MMOL/L — LOW (ref 3.5–5.3)
PROT SERPL-MCNC: 7 G/DL — SIGNIFICANT CHANGE UP (ref 6–8.3)
PROT UR-MCNC: NEGATIVE MG/DL — SIGNIFICANT CHANGE UP
RBC # BLD: 4.37 M/UL — SIGNIFICANT CHANGE UP (ref 4.2–5.8)
RBC # FLD: 12.1 % — SIGNIFICANT CHANGE UP (ref 10.3–14.5)
SODIUM SERPL-SCNC: 132 MMOL/L — LOW (ref 135–145)
SP GR SPEC: 1.01 — SIGNIFICANT CHANGE UP (ref 1–1.03)
UROBILINOGEN FLD QL: 0.2 MG/DL — SIGNIFICANT CHANGE UP (ref 0.2–1)
WBC # BLD: 12.84 K/UL — HIGH (ref 3.8–10.5)
WBC # FLD AUTO: 12.84 K/UL — HIGH (ref 3.8–10.5)

## 2024-12-25 PROCEDURE — 99285 EMERGENCY DEPT VISIT HI MDM: CPT

## 2024-12-25 RX ORDER — TAMSULOSIN HYDROCHLORIDE 0.4 MG/1
0.4 CAPSULE ORAL AT BEDTIME
Refills: 0 | Status: DISCONTINUED | OUTPATIENT
Start: 2024-12-25 | End: 2024-12-30

## 2024-12-25 RX ORDER — ENOXAPARIN SODIUM 60 MG/.6ML
40 INJECTION INTRAVENOUS; SUBCUTANEOUS EVERY 24 HOURS
Refills: 0 | Status: DISCONTINUED | OUTPATIENT
Start: 2024-12-25 | End: 2024-12-30

## 2024-12-25 RX ORDER — POTASSIUM CHLORIDE 600 MG/1
40 TABLET, FILM COATED, EXTENDED RELEASE ORAL ONCE
Refills: 0 | Status: COMPLETED | OUTPATIENT
Start: 2024-12-25 | End: 2024-12-25

## 2024-12-25 RX ORDER — FINASTERIDE 5 MG
5 TABLET ORAL DAILY
Refills: 0 | Status: DISCONTINUED | OUTPATIENT
Start: 2024-12-25 | End: 2024-12-30

## 2024-12-25 RX ORDER — SODIUM CHLORIDE 9 MG/ML
1000 INJECTION, SOLUTION INTRAMUSCULAR; INTRAVENOUS; SUBCUTANEOUS
Refills: 0 | Status: DISCONTINUED | OUTPATIENT
Start: 2024-12-25 | End: 2024-12-27

## 2024-12-25 RX ORDER — ACETAMINOPHEN 80 MG/.8ML
650 SOLUTION/ DROPS ORAL ONCE
Refills: 0 | Status: COMPLETED | OUTPATIENT
Start: 2024-12-25 | End: 2024-12-25

## 2024-12-25 RX ORDER — SODIUM CHLORIDE 9 MG/ML
1000 INJECTION, SOLUTION INTRAMUSCULAR; INTRAVENOUS; SUBCUTANEOUS ONCE
Refills: 0 | Status: COMPLETED | OUTPATIENT
Start: 2024-12-25 | End: 2024-12-25

## 2024-12-25 RX ORDER — ACETAMINOPHEN 80 MG/.8ML
650 SOLUTION/ DROPS ORAL EVERY 6 HOURS
Refills: 0 | Status: DISCONTINUED | OUTPATIENT
Start: 2024-12-25 | End: 2024-12-30

## 2024-12-25 RX ADMIN — POTASSIUM CHLORIDE 40 MILLIEQUIVALENT(S): 600 TABLET, FILM COATED, EXTENDED RELEASE ORAL at 19:29

## 2024-12-25 RX ADMIN — SODIUM CHLORIDE 1000 MILLILITER(S): 9 INJECTION, SOLUTION INTRAMUSCULAR; INTRAVENOUS; SUBCUTANEOUS at 19:59

## 2024-12-25 RX ADMIN — ACETAMINOPHEN 650 MILLIGRAM(S): 80 SOLUTION/ DROPS ORAL at 19:29

## 2024-12-25 RX ADMIN — ACETAMINOPHEN 650 MILLIGRAM(S): 80 SOLUTION/ DROPS ORAL at 20:00

## 2024-12-25 NOTE — ED ADULT NURSE NOTE - CCCP TRG CHIEF CMPLNT
Additional Notes: Discussed AK located on the right forehead LN2 decline but will monitor for changes Render Risk Assessment In Note?: no Detail Level: Simple Additional Notes: Psoriasis on scalp and elbows \\nPatient uses Lexette (halobetasol) foam at this time.\\n- reports Clobetasol solution does not work as well.\\n\\nLexette coupon no longer active\\nWill attempt to get halobetasol lotion urinary symptoms Additional Notes: Bx proven\\nRight forehead \\nLab results discussed\\nDiscussed possible progression to SCC\\nPt states nothing is there today, declines Ln2\\nWill monitor and RTC if lesion recurs

## 2024-12-25 NOTE — H&P ADULT - HISTORY OF PRESENT ILLNESS
58y/M, with h/o Psoriasis and chronic back pain presented to ED after unable to urinate since today morning. Also endorses back pain, which started today after her was unable to urinate.  58y/M, with h/o Psoriasis and chronic back pain presented to ED after unable to urinate since today morning. Also endorses back pain, which started today after he was unable to urinate. Denies abdominal discomfort or pain, denies fever and chills, weakness or numbness in extremities, denies trauma or use of any medications. Denies any history of prostate pathology, DM, HTN, HLD. States this is the first time this has happened to him. Mentions he has constipation, his last bm was yesterday,  Ceballos was placed in ED, he had >3000cc of urine outpt in 4hrs. Mentions his back pain improved after ceballos cath.

## 2024-12-25 NOTE — ED ADULT NURSE NOTE - OBJECTIVE STATEMENT
Patient present with c/o urinary retention since this morning, blader scan completed 1100cc urine noted.  16F Lyles Catheter inserted , no resistance met 1200cc urine  output  with blood tinge noted.

## 2024-12-25 NOTE — H&P ADULT - ATTENDING COMMENTS
Pt appearing to be exhibiting polyuria secondary to a postobstructive hyperdiuresis effect hence will monitor BMP while on victoriano IV NS to assure no severe dehydration ensuing.  Started on Proscar / Flomax.

## 2024-12-25 NOTE — ED PROVIDER NOTE - OBJECTIVE STATEMENT
[id number]. 58-year-old male with no past medical history, on no medications, follows with his primary care doctor, presents with urinary retention since this morning. Also notes he has had 3 years of back pain and the back pain increased since the urinary retention began. Denies all other symptoms including fever, vomiting, weakness or numbness of the extremities. Feels significantly improved following Lyles catheter insertion.

## 2024-12-25 NOTE — ED PROVIDER NOTE - CARE PLAN
1 Principal Discharge DX:	Acute urinary retention  Secondary Diagnosis:	Back pain   Principal Discharge DX:	Postobstructive diuresis  Secondary Diagnosis:	Acute urinary retention  Secondary Diagnosis:	Back pain

## 2024-12-25 NOTE — H&P ADULT - ASSESSMENT
58y/M, with h/o Psoriasis and chronic back pain presented to ED after unable to urinate since today morning. S/p ceballos catheterisation in ED, pt had UO of  >3000cc within 4hrs . Admitted for post obstructive diuresis.     Pt needs med rec. uses topical ointment for psoriasis. does not remember name

## 2024-12-25 NOTE — H&P ADULT - PROBLEM SELECTOR PLAN 2
p/w acute urinary retention.   denies use of any meds  denies dx of prostate pathology  UA negative  s/p ceballos catheterisation in ed  -started tamsulosin and proscar  -rest of the plan as above  -urolofy f/u outpt

## 2024-12-25 NOTE — ED PROVIDER NOTE - CLINICAL SUMMARY MEDICAL DECISION MAKING FREE TEXT BOX
No evidence of cord compression and low suspicion for this as cause of his urinary retention. No evidence of DVT or PE. Mild hypokalemia which was repleted. Also some hyponatremia. Character low suspicion for kidney stone or lesion to warrant CT imaging. Character consistent with acute urinary retention. Lyles catheter placed with resolution of retention and symptoms. No CHIOMA or evidence of UTI. No evidence of cord compression and low suspicion for this as cause of his urinary retention. No evidence of DVT or PE. Mild hypokalemia which was repleted. Also some hyponatremia. Character low suspicion for kidney stone or lesion to warrant CT imaging. Character consistent with acute urinary retention. Lyles catheter placed with resolution of retention and symptoms. No CHIOMA or evidence of UTI. After more than an hour of observation, patient still with rapid urine output. Over 20 minutes put out approximately 350 mL of urine patient also with some hyponatremia and hypokalemia and will admit for concern for postobstructive diuresis. Patient well appearing, hemodynamically stable. Admitted to internal medicine for further monitoring, w/u, and care.

## 2024-12-25 NOTE — H&P ADULT - NSICDXPASTMEDICALHX_GEN_ALL_CORE_FT
PAST MEDICAL HISTORY:  History of 2019 novel coronavirus disease (COVID-19)     Obesity     Psoriasis

## 2024-12-25 NOTE — H&P ADULT - NSHPREVIEWOFSYSTEMS_GEN_ALL_CORE
- CONSTITUTIONAL: Denies fever and chills  - HEENT: Denies changes in vision and hearing.  - RESPIRATORY: Denies SOB and cough.  - CV: Denies chest pain and palpitations  - GI: Denies abdominal pain, nausea, vomiting and diarrhea.  - : Denies dysuria   - SKIN: Denies rash and pruritus.  - NEUROLOGICAL: Denies headache and syncope.  - PSYCHIATRIC: Denies recent changes in mood. Denies anxiety and depression.

## 2024-12-25 NOTE — H&P ADULT - NSHPPHYSICALEXAM_GEN_ALL_CORE
T(C): 36.8 (12-25-24 @ 20:07), Max: 37.1 (12-25-24 @ 18:02)  HR: 65 (12-25-24 @ 20:07) (65 - 78)  BP: 131/78 (12-25-24 @ 20:07) (131/78 - 132/85)  RR: 19 (12-25-24 @ 20:07) (18 - 19)  SpO2: 97% (12-25-24 @ 20:07) (95% - 97%)      GENERAL: NAD, speaks in full sentences, no signs of respiratory distress  HEAD:  Atraumatic, Normocephalic  EYES: EOMI, PERRLA, conjunctiva and sclera clear  NECK: Supple, No JVD  CHEST/LUNG: Clear to auscultation bilaterally; No wheeze; No crackles  HEART: Regular rate and rhythm; No murmurs;   ABDOMEN: Soft, Nontender, Nondistended; Bowel sounds present; No guarding  EXTREMITIES:  2+ Peripheral Pulses, No cyanosis or edema  PSYCH: AAOx3  NEUROLOGY: non-focal  SKIN: psoriatic plaques scattered all over his body, prominently in abdomen T(C): 36.8 (12-25-24 @ 20:07), Max: 37.1 (12-25-24 @ 18:02)  HR: 65 (12-25-24 @ 20:07) (65 - 78)  BP: 131/78 (12-25-24 @ 20:07) (131/78 - 132/85)  RR: 19 (12-25-24 @ 20:07) (18 - 19)  SpO2: 97% (12-25-24 @ 20:07) (95% - 97%)      GENERAL: NAD, speaks in full sentences, no signs of respiratory distress  HEAD:  Atraumatic, Normocephalic  EYES: EOMI, PERRLA, conjunctiva and sclera clear  NECK: Supple, No JVD  CHEST/LUNG: Clear to auscultation bilaterally; No wheeze; No crackles  HEART: Regular rate and rhythm; No murmurs;   ABDOMEN: Soft, Nontender, Nondistended; Bowel sounds present  EXTREMITIES:  2+ Peripheral Pulses, No cyanosis or edema  PSYCH: AAOx3  NEUROLOGY: non-focal  SKIN: psoriatic plaques scattered all over his body, prominently in abdomen

## 2024-12-25 NOTE — ED ADULT NURSE NOTE - LANGUAGE ASSISTANCE NEEDED
Ramone Vidal received a viral test for COVID-19. They were educated on isolation and quarantine as appropriate. For any symptoms, they were directed to seek care from their PCP, given contact information to establish with a doctor, directed to an urgent care or the emergency room.
Yes-Patient/Caregiver accepts free interpretation services...

## 2024-12-25 NOTE — ED PROVIDER NOTE - PHYSICAL EXAMINATION
Examined following catheter insertion:  Afebrile, hemodynamically stable, saturating well on room air  NAD, well appearing, laying comfortably in bed, no WOB, speaking full sentences  Head NCAT  EOMI grossly, anicteric  MMM  RRR, nml S1/S2, no m/r/g  Lungs CTAB, no w/r/r  Abd soft, NT, ND, no rebound or guarding, no CVAT  AAO, CN's 3-12 grossly intact, motor 5/5 and sensation symmetric in all extremities  PASTOR spontaneously, no leg cyanosis or edema  Skin warm, well perfused, scattered annular silvery rash to skin

## 2024-12-25 NOTE — H&P ADULT - PROBLEM SELECTOR PLAN 1
UO >3000cc over 4hrs after ceballos placement  concern for postobstructive diuresis  -strict I&O  -started IVF at 200cc/hr  -monitor BMP, Mg, PO4 frequently  -replete electrolytes as needed

## 2024-12-26 LAB
A1C WITH ESTIMATED AVERAGE GLUCOSE RESULT: 5.8 % — HIGH (ref 4–5.6)
ALBUMIN SERPL ELPH-MCNC: 3.1 G/DL — LOW (ref 3.5–5)
ALP SERPL-CCNC: 74 U/L — SIGNIFICANT CHANGE UP (ref 40–120)
ALT FLD-CCNC: 20 U/L DA — SIGNIFICANT CHANGE UP (ref 10–60)
ANION GAP SERPL CALC-SCNC: 2 MMOL/L — LOW (ref 5–17)
ANION GAP SERPL CALC-SCNC: 3 MMOL/L — LOW (ref 5–17)
AST SERPL-CCNC: 18 U/L — SIGNIFICANT CHANGE UP (ref 10–40)
BASOPHILS # BLD AUTO: 0.03 K/UL — SIGNIFICANT CHANGE UP (ref 0–0.2)
BASOPHILS NFR BLD AUTO: 0.4 % — SIGNIFICANT CHANGE UP (ref 0–2)
BILIRUB SERPL-MCNC: 0.5 MG/DL — SIGNIFICANT CHANGE UP (ref 0.2–1.2)
BUN SERPL-MCNC: 10 MG/DL — SIGNIFICANT CHANGE UP (ref 7–18)
BUN SERPL-MCNC: 11 MG/DL — SIGNIFICANT CHANGE UP (ref 7–18)
CALCIUM SERPL-MCNC: 8.2 MG/DL — LOW (ref 8.4–10.5)
CALCIUM SERPL-MCNC: 8.3 MG/DL — LOW (ref 8.4–10.5)
CHLORIDE SERPL-SCNC: 111 MMOL/L — HIGH (ref 96–108)
CHLORIDE SERPL-SCNC: 113 MMOL/L — HIGH (ref 96–108)
CHOLEST SERPL-MCNC: 167 MG/DL — SIGNIFICANT CHANGE UP
CO2 SERPL-SCNC: 26 MMOL/L — SIGNIFICANT CHANGE UP (ref 22–31)
CO2 SERPL-SCNC: 27 MMOL/L — SIGNIFICANT CHANGE UP (ref 22–31)
CREAT SERPL-MCNC: 0.71 MG/DL — SIGNIFICANT CHANGE UP (ref 0.5–1.3)
CREAT SERPL-MCNC: 0.72 MG/DL — SIGNIFICANT CHANGE UP (ref 0.5–1.3)
EGFR: 105 ML/MIN/1.73M2 — SIGNIFICANT CHANGE UP
EGFR: 105 ML/MIN/1.73M2 — SIGNIFICANT CHANGE UP
EOSINOPHIL # BLD AUTO: 0.22 K/UL — SIGNIFICANT CHANGE UP (ref 0–0.5)
EOSINOPHIL NFR BLD AUTO: 2.6 % — SIGNIFICANT CHANGE UP (ref 0–6)
ESTIMATED AVERAGE GLUCOSE: 120 MG/DL — HIGH (ref 68–114)
GLUCOSE SERPL-MCNC: 101 MG/DL — HIGH (ref 70–99)
GLUCOSE SERPL-MCNC: 97 MG/DL — SIGNIFICANT CHANGE UP (ref 70–99)
HCT VFR BLD CALC: 38.6 % — LOW (ref 39–50)
HDLC SERPL-MCNC: 46 MG/DL — SIGNIFICANT CHANGE UP
HGB BLD-MCNC: 13.4 G/DL — SIGNIFICANT CHANGE UP (ref 13–17)
IMM GRANULOCYTES NFR BLD AUTO: 0.4 % — SIGNIFICANT CHANGE UP (ref 0–0.9)
LIPID PNL WITH DIRECT LDL SERPL: 90 MG/DL — SIGNIFICANT CHANGE UP
LYMPHOCYTES # BLD AUTO: 1.96 K/UL — SIGNIFICANT CHANGE UP (ref 1–3.3)
LYMPHOCYTES # BLD AUTO: 23.4 % — SIGNIFICANT CHANGE UP (ref 13–44)
MAGNESIUM SERPL-MCNC: 2.1 MG/DL — SIGNIFICANT CHANGE UP (ref 1.6–2.6)
MAGNESIUM SERPL-MCNC: 2.1 MG/DL — SIGNIFICANT CHANGE UP (ref 1.6–2.6)
MCHC RBC-ENTMCNC: 31.2 PG — SIGNIFICANT CHANGE UP (ref 27–34)
MCHC RBC-ENTMCNC: 34.7 G/DL — SIGNIFICANT CHANGE UP (ref 32–36)
MCV RBC AUTO: 89.8 FL — SIGNIFICANT CHANGE UP (ref 80–100)
MONOCYTES # BLD AUTO: 0.96 K/UL — HIGH (ref 0–0.9)
MONOCYTES NFR BLD AUTO: 11.5 % — SIGNIFICANT CHANGE UP (ref 2–14)
NEUTROPHILS # BLD AUTO: 5.17 K/UL — SIGNIFICANT CHANGE UP (ref 1.8–7.4)
NEUTROPHILS NFR BLD AUTO: 61.7 % — SIGNIFICANT CHANGE UP (ref 43–77)
NON HDL CHOLESTEROL: 121 MG/DL — SIGNIFICANT CHANGE UP
NRBC # BLD: 0 /100 WBCS — SIGNIFICANT CHANGE UP (ref 0–0)
PHOSPHATE SERPL-MCNC: 1.8 MG/DL — LOW (ref 2.5–4.5)
PHOSPHATE SERPL-MCNC: 1.9 MG/DL — LOW (ref 2.5–4.5)
PLATELET # BLD AUTO: 196 K/UL — SIGNIFICANT CHANGE UP (ref 150–400)
POTASSIUM SERPL-MCNC: 3.7 MMOL/L — SIGNIFICANT CHANGE UP (ref 3.5–5.3)
POTASSIUM SERPL-MCNC: 3.7 MMOL/L — SIGNIFICANT CHANGE UP (ref 3.5–5.3)
POTASSIUM SERPL-SCNC: 3.7 MMOL/L — SIGNIFICANT CHANGE UP (ref 3.5–5.3)
POTASSIUM SERPL-SCNC: 3.7 MMOL/L — SIGNIFICANT CHANGE UP (ref 3.5–5.3)
PROT SERPL-MCNC: 6 G/DL — SIGNIFICANT CHANGE UP (ref 6–8.3)
RBC # BLD: 4.3 M/UL — SIGNIFICANT CHANGE UP (ref 4.2–5.8)
RBC # FLD: 12.6 % — SIGNIFICANT CHANGE UP (ref 10.3–14.5)
SODIUM SERPL-SCNC: 140 MMOL/L — SIGNIFICANT CHANGE UP (ref 135–145)
SODIUM SERPL-SCNC: 142 MMOL/L — SIGNIFICANT CHANGE UP (ref 135–145)
TRIGL SERPL-MCNC: 183 MG/DL — HIGH
TSH SERPL-MCNC: 2.7 UU/ML — SIGNIFICANT CHANGE UP (ref 0.34–4.82)
WBC # BLD: 8.37 K/UL — SIGNIFICANT CHANGE UP (ref 3.8–10.5)
WBC # FLD AUTO: 8.37 K/UL — SIGNIFICANT CHANGE UP (ref 3.8–10.5)

## 2024-12-26 RX ORDER — POTASSIUM PHOSPHATE, MONOBASIC AND POTASSIUM PHOSPHATE, DIBASIC 224; 236 MG/ML; MG/ML
15 INJECTION, SOLUTION INTRAVENOUS ONCE
Refills: 0 | Status: COMPLETED | OUTPATIENT
Start: 2024-12-26 | End: 2024-12-26

## 2024-12-26 RX ORDER — INFLUENZA A VIRUS A/WISCONSIN/588/2019 (H1N1) RECOMBINANT HEMAGGLUTININ ANTIGEN, INFLUENZA A VIRUS A/DARWIN/6/2021 (H3N2) RECOMBINANT HEMAGGLUTININ ANTIGEN, INFLUENZA B VIRUS B/AUSTRIA/1359417/2021 RECOMBINANT HEMAGGLUTININ ANTIGEN, AND INFLUENZA B VIRUS B/PHUKET/3073/2013 RECOMBINANT HEMAGGLUTININ ANTIGEN 45; 45; 45; 45 UG/.5ML; UG/.5ML; UG/.5ML; UG/.5ML
0.5 INJECTION INTRAMUSCULAR ONCE
Refills: 0 | Status: DISCONTINUED | OUTPATIENT
Start: 2024-12-26 | End: 2024-12-30

## 2024-12-26 RX ORDER — POLYETHYLENE GLYCOL 3350 17 G/DOSE
17 POWDER (GRAM) ORAL
Refills: 0 | Status: DISCONTINUED | OUTPATIENT
Start: 2024-12-26 | End: 2024-12-30

## 2024-12-26 RX ORDER — SENNOSIDES 8.6 MG/1
2 TABLET, FILM COATED ORAL AT BEDTIME
Refills: 0 | Status: DISCONTINUED | OUTPATIENT
Start: 2024-12-26 | End: 2024-12-30

## 2024-12-26 RX ORDER — SOD PHOS DI, MONO/K PHOS MONO 250 MG
1 TABLET ORAL ONCE
Refills: 0 | Status: COMPLETED | OUTPATIENT
Start: 2024-12-26 | End: 2024-12-26

## 2024-12-26 RX ADMIN — TAMSULOSIN HYDROCHLORIDE 0.4 MILLIGRAM(S): 0.4 CAPSULE ORAL at 01:07

## 2024-12-26 RX ADMIN — SENNOSIDES 2 TABLET(S): 8.6 TABLET, FILM COATED ORAL at 21:16

## 2024-12-26 RX ADMIN — Medication 5 MILLIGRAM(S): at 11:19

## 2024-12-26 RX ADMIN — TAMSULOSIN HYDROCHLORIDE 0.4 MILLIGRAM(S): 0.4 CAPSULE ORAL at 21:16

## 2024-12-26 RX ADMIN — Medication 1 PACKET(S): at 07:47

## 2024-12-26 RX ADMIN — Medication 5 MILLIGRAM(S): at 01:06

## 2024-12-26 RX ADMIN — POTASSIUM PHOSPHATE, MONOBASIC AND POTASSIUM PHOSPHATE, DIBASIC 62.5 MILLIMOLE(S): 224; 236 INJECTION, SOLUTION INTRAVENOUS at 11:19

## 2024-12-26 RX ADMIN — SODIUM CHLORIDE 200 MILLILITER(S): 9 INJECTION, SOLUTION INTRAMUSCULAR; INTRAVENOUS; SUBCUTANEOUS at 02:04

## 2024-12-26 RX ADMIN — ENOXAPARIN SODIUM 40 MILLIGRAM(S): 60 INJECTION INTRAVENOUS; SUBCUTANEOUS at 01:07

## 2024-12-27 DIAGNOSIS — Z75.8 OTHER PROBLEMS RELATED TO MEDICAL FACILITIES AND OTHER HEALTH CARE: ICD-10-CM

## 2024-12-27 LAB
ANION GAP SERPL CALC-SCNC: 5 MMOL/L — SIGNIFICANT CHANGE UP (ref 5–17)
APPEARANCE UR: CLEAR — SIGNIFICANT CHANGE UP
BACTERIA # UR AUTO: ABNORMAL /HPF
BILIRUB UR-MCNC: NEGATIVE — SIGNIFICANT CHANGE UP
BUN SERPL-MCNC: 13 MG/DL — SIGNIFICANT CHANGE UP (ref 7–18)
CALCIUM SERPL-MCNC: 8.9 MG/DL — SIGNIFICANT CHANGE UP (ref 8.4–10.5)
CHLORIDE SERPL-SCNC: 109 MMOL/L — HIGH (ref 96–108)
CO2 SERPL-SCNC: 26 MMOL/L — SIGNIFICANT CHANGE UP (ref 22–31)
COLOR SPEC: YELLOW — SIGNIFICANT CHANGE UP
COMMENT - URINE: SIGNIFICANT CHANGE UP
CREAT ?TM UR-MCNC: 113 MG/DL — SIGNIFICANT CHANGE UP
CREAT SERPL-MCNC: 0.72 MG/DL — SIGNIFICANT CHANGE UP (ref 0.5–1.3)
DIFF PNL FLD: ABNORMAL
EGFR: 105 ML/MIN/1.73M2 — SIGNIFICANT CHANGE UP
GLUCOSE SERPL-MCNC: 107 MG/DL — HIGH (ref 70–99)
GLUCOSE UR QL: NEGATIVE MG/DL — SIGNIFICANT CHANGE UP
HCT VFR BLD CALC: 40.5 % — SIGNIFICANT CHANGE UP (ref 39–50)
HGB BLD-MCNC: 14 G/DL — SIGNIFICANT CHANGE UP (ref 13–17)
KETONES UR-MCNC: NEGATIVE MG/DL — SIGNIFICANT CHANGE UP
LEUKOCYTE ESTERASE UR-ACNC: ABNORMAL
MCHC RBC-ENTMCNC: 31.4 PG — SIGNIFICANT CHANGE UP (ref 27–34)
MCHC RBC-ENTMCNC: 34.6 G/DL — SIGNIFICANT CHANGE UP (ref 32–36)
MCV RBC AUTO: 90.8 FL — SIGNIFICANT CHANGE UP (ref 80–100)
NITRITE UR-MCNC: NEGATIVE — SIGNIFICANT CHANGE UP
NRBC # BLD: 0 /100 WBCS — SIGNIFICANT CHANGE UP (ref 0–0)
PH UR: 5.5 — SIGNIFICANT CHANGE UP (ref 5–8)
PHOSPHATE SERPL-MCNC: 2.8 MG/DL — SIGNIFICANT CHANGE UP (ref 2.5–4.5)
PLATELET # BLD AUTO: 196 K/UL — SIGNIFICANT CHANGE UP (ref 150–400)
POTASSIUM SERPL-MCNC: 3.5 MMOL/L — SIGNIFICANT CHANGE UP (ref 3.5–5.3)
POTASSIUM SERPL-SCNC: 3.5 MMOL/L — SIGNIFICANT CHANGE UP (ref 3.5–5.3)
POTASSIUM UR-SCNC: 52 MMOL/L — SIGNIFICANT CHANGE UP
PROT ?TM UR-MCNC: 119 MG/DL — HIGH (ref 0–12)
PROT UR-MCNC: 100 MG/DL
PROT/CREAT UR-RTO: 1.1 RATIO — HIGH (ref 0–0.2)
RBC # BLD: 4.46 M/UL — SIGNIFICANT CHANGE UP (ref 4.2–5.8)
RBC # FLD: 12.6 % — SIGNIFICANT CHANGE UP (ref 10.3–14.5)
RBC CASTS # UR COMP ASSIST: 25 /HPF — HIGH (ref 0–4)
SODIUM SERPL-SCNC: 140 MMOL/L — SIGNIFICANT CHANGE UP (ref 135–145)
SODIUM UR-SCNC: 66 MMOL/L — SIGNIFICANT CHANGE UP
SP GR SPEC: 1.02 — SIGNIFICANT CHANGE UP (ref 1–1.03)
UROBILINOGEN FLD QL: 1 MG/DL — SIGNIFICANT CHANGE UP (ref 0.2–1)
UUN UR-MCNC: 754 MG/DL — SIGNIFICANT CHANGE UP
WBC # BLD: 8.85 K/UL — SIGNIFICANT CHANGE UP (ref 3.8–10.5)
WBC # FLD AUTO: 8.85 K/UL — SIGNIFICANT CHANGE UP (ref 3.8–10.5)
WBC UR QL: 5 /HPF — SIGNIFICANT CHANGE UP (ref 0–5)

## 2024-12-27 PROCEDURE — 74176 CT ABD & PELVIS W/O CONTRAST: CPT | Mod: 26

## 2024-12-27 RX ORDER — BISACODYL 5 MG
10 TABLET, DELAYED RELEASE (ENTERIC COATED) ORAL ONCE
Refills: 0 | Status: COMPLETED | OUTPATIENT
Start: 2024-12-27 | End: 2024-12-27

## 2024-12-27 RX ORDER — SODIUM CHLORIDE 9 MG/ML
1000 INJECTION, SOLUTION INTRAMUSCULAR; INTRAVENOUS; SUBCUTANEOUS
Refills: 0 | Status: DISCONTINUED | OUTPATIENT
Start: 2024-12-27 | End: 2024-12-28

## 2024-12-27 RX ORDER — CHLORHEXIDINE GLUCONATE 1.2 MG/ML
1 RINSE ORAL
Refills: 0 | Status: DISCONTINUED | OUTPATIENT
Start: 2024-12-27 | End: 2024-12-30

## 2024-12-27 RX ADMIN — TAMSULOSIN HYDROCHLORIDE 0.4 MILLIGRAM(S): 0.4 CAPSULE ORAL at 21:49

## 2024-12-27 RX ADMIN — Medication 10 MILLIGRAM(S): at 12:50

## 2024-12-27 RX ADMIN — SENNOSIDES 2 TABLET(S): 8.6 TABLET, FILM COATED ORAL at 21:49

## 2024-12-27 RX ADMIN — Medication 5 MILLIGRAM(S): at 12:51

## 2024-12-27 RX ADMIN — SODIUM CHLORIDE 200 MILLILITER(S): 9 INJECTION, SOLUTION INTRAMUSCULAR; INTRAVENOUS; SUBCUTANEOUS at 12:51

## 2024-12-27 NOTE — DISCHARGE NOTE NURSING/CASE MANAGEMENT/SOCIAL WORK - NSDCFUADDAPPT_GEN_ALL_CORE_FT
Outpt f/u with Dr. Khan upon discharge for prostate management   Outpt f/u with Dr. Khan upon discharge for prostate management  [facility] Certified Special Services Phone: [phone] [phone]

## 2024-12-27 NOTE — DISCHARGE NOTE NURSING/CASE MANAGEMENT/SOCIAL WORK - FINANCIAL ASSISTANCE
[facility] provides services at a reduced cost to those who are determined to be eligible through [facility]’s financial assistance program. Information regarding [facility]’s financial assistance program can be found by going to [email or web address] or by calling [phone].

## 2024-12-27 NOTE — CONSULT NOTE ADULT - ASSESSMENT
58y.o. Male with urinary retention likely secondary to BPH, with high urinary output    -Strict I/O's  -Keep ceballos catheter until UO <200mL/hr  -No electrolyte imbalance at current time  -May TOV if UO improves  -PVR when TOV  -Rec CT abd/pelvis to eval urinary system & prostate  -Flomax and proscar  -Pre-DM management  -Outpt f/u with Dr. Khan upon discharge for prostate management 58y.o. Male with urinary retention likely secondary to BPH, with high urinary output    -Strict I/O's  -No electrolyte imbalance at current time  -Keep ceballos catheter x1 week  -Flomax  -Pre-DM management  -Outpt f/u with Dr. Khan upon discharge for prostate management

## 2024-12-27 NOTE — DISCHARGE NOTE NURSING/CASE MANAGEMENT/SOCIAL WORK - PATIENT PORTAL LINK FT
You can access the FollowMyHealth Patient Portal offered by [facility] by registering at the following website: [email or web address]. By joining [employer]’s FollowMyHealth portal, you will also be able to view your health information using other applications (apps) compatible with our system.

## 2024-12-27 NOTE — PROGRESS NOTE ADULT - PROBLEM SELECTOR PLAN 1
UO >3000cc over 4hrs after ceballos placement  concern for postobstructive diuresis  -strict I&O  -started IVF at 200cc/hr  -monitor BMP, Mg, PO4 frequently  -replete electrolytes as needed
UO >3000cc over 4hrs after ceballos placement  concern for postobstructive diuresis  -strict I&O  -started IVF at 200cc/hr  -monitor BMP, Mg, PO4   -replete electrolytes as needed  - repeat U/A on 12/27 showed large protein in urine --> Nephro consulted

## 2024-12-27 NOTE — PROGRESS NOTE ADULT - ATTENDING COMMENTS
Pt appearing to be exhibiting polyuria secondary to a postobstructive hyperdiuresis effect, hence will monitor BMP while on victoriano IV NS to assure no severe dehydration from ensuing.  Started on Proscar / Flomax.
Pt appearing to be exhibiting polyuria secondary to a postobstructive hyperdiuresis effect, hence will monitor BMP while on victoriano IV NS to assure no severe dehydration from ensuing.  Started on Proscar / Flomax.    12/27/24 - Urology consult with possible plan for TOV to follow.

## 2024-12-27 NOTE — PROGRESS NOTE ADULT - PROBLEM SELECTOR PLAN 2
p/w acute urinary retention.   denies use of any meds  denies dx of prostate pathology  UA negative  s/p ceballos catheterisation in ed  -started tamsulosin and proscar  -patient to remain with ceballos x 1 week per Urology and f/u outpatient  -rest of the plan as above
p/w acute urinary retention.   denies use of any meds  denies dx of prostate pathology  UA negative  s/p ceballos catheterisation in ed  -started tamsulosin and proscar  -rest of the plan as above  -urolofy f/u outpt

## 2024-12-28 LAB
ANION GAP SERPL CALC-SCNC: 6 MMOL/L — SIGNIFICANT CHANGE UP (ref 5–17)
BUN SERPL-MCNC: 17 MG/DL — SIGNIFICANT CHANGE UP (ref 7–18)
CALCIUM SERPL-MCNC: 9.2 MG/DL — SIGNIFICANT CHANGE UP (ref 8.4–10.5)
CHLORIDE SERPL-SCNC: 108 MMOL/L — SIGNIFICANT CHANGE UP (ref 96–108)
CO2 SERPL-SCNC: 25 MMOL/L — SIGNIFICANT CHANGE UP (ref 22–31)
CREAT SERPL-MCNC: 0.8 MG/DL — SIGNIFICANT CHANGE UP (ref 0.5–1.3)
EGFR: 101 ML/MIN/1.73M2 — SIGNIFICANT CHANGE UP
GLUCOSE SERPL-MCNC: 113 MG/DL — HIGH (ref 70–99)
HCT VFR BLD CALC: 45.2 % — SIGNIFICANT CHANGE UP (ref 39–50)
HGB BLD-MCNC: 15.4 G/DL — SIGNIFICANT CHANGE UP (ref 13–17)
MCHC RBC-ENTMCNC: 30.4 PG — SIGNIFICANT CHANGE UP (ref 27–34)
MCHC RBC-ENTMCNC: 34.1 G/DL — SIGNIFICANT CHANGE UP (ref 32–36)
MCV RBC AUTO: 89.3 FL — SIGNIFICANT CHANGE UP (ref 80–100)
NRBC # BLD: 0 /100 WBCS — SIGNIFICANT CHANGE UP (ref 0–0)
PLATELET # BLD AUTO: 229 K/UL — SIGNIFICANT CHANGE UP (ref 150–400)
POTASSIUM SERPL-MCNC: 3.6 MMOL/L — SIGNIFICANT CHANGE UP (ref 3.5–5.3)
POTASSIUM SERPL-SCNC: 3.6 MMOL/L — SIGNIFICANT CHANGE UP (ref 3.5–5.3)
RBC # BLD: 5.06 M/UL — SIGNIFICANT CHANGE UP (ref 4.2–5.8)
RBC # FLD: 12.4 % — SIGNIFICANT CHANGE UP (ref 10.3–14.5)
SODIUM SERPL-SCNC: 139 MMOL/L — SIGNIFICANT CHANGE UP (ref 135–145)
WBC # BLD: 9.9 K/UL — SIGNIFICANT CHANGE UP (ref 3.8–10.5)
WBC # FLD AUTO: 9.9 K/UL — SIGNIFICANT CHANGE UP (ref 3.8–10.5)

## 2024-12-28 RX ORDER — SODIUM CHLORIDE 9 MG/ML
1000 INJECTION, SOLUTION INTRAMUSCULAR; INTRAVENOUS; SUBCUTANEOUS
Refills: 0 | Status: DISCONTINUED | OUTPATIENT
Start: 2024-12-28 | End: 2024-12-29

## 2024-12-28 RX ADMIN — SODIUM CHLORIDE 80 MILLILITER(S): 9 INJECTION, SOLUTION INTRAMUSCULAR; INTRAVENOUS; SUBCUTANEOUS at 13:39

## 2024-12-28 RX ADMIN — TAMSULOSIN HYDROCHLORIDE 0.4 MILLIGRAM(S): 0.4 CAPSULE ORAL at 21:41

## 2024-12-28 RX ADMIN — ENOXAPARIN SODIUM 40 MILLIGRAM(S): 60 INJECTION INTRAVENOUS; SUBCUTANEOUS at 06:15

## 2024-12-28 RX ADMIN — SODIUM CHLORIDE 80 MILLILITER(S): 9 INJECTION, SOLUTION INTRAMUSCULAR; INTRAVENOUS; SUBCUTANEOUS at 21:41

## 2024-12-28 RX ADMIN — Medication 5 MILLIGRAM(S): at 13:40

## 2024-12-28 RX ADMIN — CHLORHEXIDINE GLUCONATE 1 APPLICATION(S): 1.2 RINSE ORAL at 06:15

## 2024-12-28 RX ADMIN — SENNOSIDES 2 TABLET(S): 8.6 TABLET, FILM COATED ORAL at 21:40

## 2024-12-28 NOTE — CONSULT NOTE ADULT - ASSESSMENT
Post Obstructive Diuresis:  S/P obstruction with large prostate.  Urine output has now improved.  - Monitor urine output.  - Hold IVF.  - Monitor BMP.  - If UO is > 100 ml/hour, resume 1/2 NS at 1/3 to 1/2 rate of urine flow.

## 2024-12-28 NOTE — CONSULT NOTE ADULT - SUBJECTIVE AND OBJECTIVE BOX
Patient is a 58y old  Male who presents with a chief complaint of Urinary retention (27 Dec 2024 12:41)      HPI:  58y/M, with h/o Psoriasis and chronic back pain presented to ED after unable to urinate since today morning. Also endorses back pain, which started today after he was unable to urinate. Denies abdominal discomfort or pain, denies fever and chills, weakness or numbness in extremities, denies trauma or use of any medications. Denies any history of prostate pathology, DM, HTN, HLD. States this is the first time this has happened to him. Mentions he has constipation, his last bm was yesterday,  Ceballos was placed in ED, he had >3000cc of urine outpt in 4hrs. Mentions his back pain improved after ceballos cath. (25 Dec 2024 22:49)    Called see and federica 58y.o. Male w/no significant PMH for urinary retention. Pt admitted 2 days ago for inability to void. Pt states he consume alcohol the night before and had trouble urinating afterwards. Ceballos catheter was placed in ED and >3L UO in 4 hrs. Pt states never had trouble urinating in the past. Endorses nocturia about twice nightly, but otherwise denies dysuria, frequency, hesitation, hematuria. UO yesterday ~2L.    PAST MEDICAL & SURGICAL HISTORY:  Obesity      History of 2019 novel coronavirus disease (COVID-19)      Psoriasis      No significant past surgical history    MEDICATIONS  (STANDING):  enoxaparin Injectable 40 milliGRAM(s) SubCutaneous every 24 hours  finasteride 5 milliGRAM(s) Oral daily  influenza   Vaccine 0.5 milliLiter(s) IntraMuscular once  senna 2 Tablet(s) Oral at bedtime  sodium chloride 0.9%. 1000 milliLiter(s) (200 mL/Hr) IV Continuous <Continuous>  tamsulosin 0.4 milliGRAM(s) Oral at bedtime    MEDICATIONS  (PRN):  acetaminophen     Tablet .. 650 milliGRAM(s) Oral every 6 hours PRN Temp greater or equal to 38C (100.4F), Mild Pain (1 - 3)  polyethylene glycol 3350 17 Gram(s) Oral two times a day PRN Constipation      Allergies    No Known Allergies    Intolerances    Vital Signs Last 24 Hrs  T(C): 36.7 (27 Dec 2024 06:23), Max: 36.7 (26 Dec 2024 19:13)  T(F): 98.1 (27 Dec 2024 06:23), Max: 98.1 (26 Dec 2024 19:13)  HR: 69 (27 Dec 2024 06:23) (69 - 80)  BP: 137/81 (27 Dec 2024 06:23) (137/81 - 143/84)  BP(mean): 100 (27 Dec 2024 06:23) (100 - 100)  RR: 18 (27 Dec 2024 06:23) (18 - 18)  SpO2: 96% (27 Dec 2024 06:23) (95% - 96%)    Parameters below as of 27 Dec 2024 06:23  Patient On (Oxygen Delivery Method): room air    Physical:  Gen: A&Ox3. NAD  Abd: Soft ND, NT  Back: No CVAT b/l  Pelvis: Uncircumcised penis with catheter in place.   AIDA: 2+ sphincter tone, stool in rectal vault, enlarged bilobed prostate    I&O's Detail    26 Dec 2024 07:01  -  27 Dec 2024 07:00  --------------------------------------------------------  IN:  Total IN: 0 mL    OUT:    Indwelling Catheter - Urethral (mL): 5250 mL  Total OUT: 5250 mL    Total NET: -5250 mL    LABS:                        14.0   8.85  )-----------( 196      ( 27 Dec 2024 06:07 )             40.5              12-27    140  |  109[H]  |  13  ----------------------------<  107[H]  3.5   |  26  |  0.72    Ca    8.9      27 Dec 2024 06:07  Phos  2.8     12-27  Mg     2.1     12-26    TPro  6.0  /  Alb  3.1[L]  /  TBili  0.5  /  DBili  x   /  AST  18  /  ALT  20  /  AlkPhos  74  12-26    A1C with Estimated Average Glucose (12.26.24 @ 02:15)    A1C with Estimated Average Glucose Result: 5.8: Method: Immunoassay       Reference Range                4.0-5.6%       High risk (prediabetic)        5.7-6.4%       Diabetic, diagnostic             >=6.5%       ADA diabetic treatment goal       <7.0%  The Hemoglobin A1c testing is NGS-certified.Reference ranges are based  upon the 2010 recommendations of  the American Diabetes Association.  Interpretation may vary for children  and adolescents. %   Estimated Average Glucose: 120: The Estimated Average Glucose (eAG) or Mean Plasma Glucose (MPG) value is  calculated from the hemoglobin A1c value and covers the same time period.   The American Diabetes Association (ADA) and other professional  organizations recommend reporting the eAG with the HgbA1c. mg/dL                  Urinalysis Basic - ( 27 Dec 2024 06:07 )    Color: x / Appearance: x / SG: x / pH: x  Gluc: 107 mg/dL / Ketone: x  / Bili: x / Urobili: x   Blood: x / Protein: x / Nitrite: x   Leuk Esterase: x / RBC: x / WBC x   Sq Epi: x / Non Sq Epi: x / Bacteria: x    
AMEYA IRELAND  Patient is a 58y old  Male who presents with a chief complaint of Urinary retention (28 Dec 2024 08:14)    HPI:  58y/M, with h/o Psoriasis and chronic back pain presented to ED after unable to urinate since today morning. Also endorses back pain, which started today after he was unable to urinate. Denies abdominal discomfort or pain, denies fever and chills, weakness or numbness in extremities, denies trauma or use of any medications. Denies any history of prostate pathology, DM, HTN, HLD. States this is the first time this has happened to him. Mentions he has constipation, his last bm was yesterday,  Ceballos was placed in ED, he had >3000cc of urine outpt in 4hrs. Mentions his back pain improved after ceballos cath. (25 Dec 2024 22:49)    PAST MEDICAL & SURGICAL HISTORY:  Obesity      History of 2019 novel coronavirus disease (COVID-19)      Psoriasis      No significant past surgical history        MEDICATIONS  (STANDING):  chlorhexidine 2% Cloths 1 Application(s) Topical <User Schedule>  enoxaparin Injectable 40 milliGRAM(s) SubCutaneous every 24 hours  finasteride 5 milliGRAM(s) Oral daily  influenza   Vaccine 0.5 milliLiter(s) IntraMuscular once  senna 2 Tablet(s) Oral at bedtime  sodium chloride 0.9%. 1000 milliLiter(s) (80 mL/Hr) IV Continuous <Continuous>  tamsulosin 0.4 milliGRAM(s) Oral at bedtime    Allergies    No Known Allergies    Intolerances      FAMILY HISTORY:      REVIEW OF SYSTEMS    General:  No fever, chills or night sweats.    Ophthalmologic: No changes in vision.  	  ENMT: No difficulty swallowing. 	    Respiratory and Thorax: No cough, wheezes or dyspnea.  	  Cardiovascular: No chest pains, tiredness or palpitations.	    Gastrointestinal: No dyspepsia, constipation or diarrhea.	    Genitourinary:	 No dysuria, hematuria or frequency.    Musculoskeletal: No joint pains or swelling. No muscle pains.    Neurological:	No weakness or numbness. No seizures.    Hematology/Lymphatics: No heat or cold intolerance.    Endocrine: No polyuria or polydipsia.	      Vital Signs Last 24 Hrs  T(C): 37 (28 Dec 2024 20:35), Max: 37 (28 Dec 2024 20:35)  T(F): 98.6 (28 Dec 2024 20:35), Max: 98.6 (28 Dec 2024 20:35)  HR: 72 (28 Dec 2024 20:35) (72 - 74)  BP: 155/87 (28 Dec 2024 20:35) (114/75 - 155/87)  BP(mean): --  RR: 17 (28 Dec 2024 20:35) (17 - 18)  SpO2: 96% (28 Dec 2024 20:35) (95% - 96%)    Parameters below as of 28 Dec 2024 20:35  Patient On (Oxygen Delivery Method): room air        PHYSICAL EXAMINATION:  Constitutional: He  appears comfortable and not distressed. Not diaphoretic.    Neck:  The thyroid is normal. Trachea is midline.     Breasts: Normal examination.    Respiratory: The lungs are clear to auscultation. No dullness and expansion is normal.    Cardiovascular: S1 and S2 are normal. No mummurs, rubs or gallops are present.    Gastrointestinal: The abdomen is soft. No tenderness is present. No masses are present. Bowel sounds are normal.    Genitourinary: The bladder is not distended. No CVA tenderness is present.    Extremities: No edema is noted. No deformities are present.    Neurological: Cognition is normal. Tone, power and sensation are normal. Gait is steady.    Skin: No leasions are seen  or palpated.    Lymph Nodes: No lymphadenopathy is present.    Psychiatric: Mood is appropriate. No hallucinations or flight of ideas are noted.                            15.4   9.90  )-----------( 229      ( 28 Dec 2024 08:15 )             45.2     12-28    139  |  108  |  17  ----------------------------<  113[H]  3.6   |  25  |  0.80    Ca    9.2      28 Dec 2024 08:15  Phos  2.8     12-27      I&O's Summary    27 Dec 2024 07:01  -  28 Dec 2024 07:00  --------------------------------------------------------  IN: 0 mL / OUT: 500 mL / NET: -500 mL    28 Dec 2024 07:01  -  28 Dec 2024 21:42  --------------------------------------------------------  IN: 0 mL / OUT: 200 mL / NET: -200 mL

## 2024-12-29 LAB
ANION GAP SERPL CALC-SCNC: 7 MMOL/L — SIGNIFICANT CHANGE UP (ref 5–17)
BUN SERPL-MCNC: 15 MG/DL — SIGNIFICANT CHANGE UP (ref 7–18)
CALCIUM SERPL-MCNC: 8.7 MG/DL — SIGNIFICANT CHANGE UP (ref 8.4–10.5)
CHLORIDE SERPL-SCNC: 109 MMOL/L — HIGH (ref 96–108)
CO2 SERPL-SCNC: 25 MMOL/L — SIGNIFICANT CHANGE UP (ref 22–31)
CREAT SERPL-MCNC: 0.72 MG/DL — SIGNIFICANT CHANGE UP (ref 0.5–1.3)
EGFR: 105 ML/MIN/1.73M2 — SIGNIFICANT CHANGE UP
GLUCOSE SERPL-MCNC: 100 MG/DL — HIGH (ref 70–99)
HCT VFR BLD CALC: 39.6 % — SIGNIFICANT CHANGE UP (ref 39–50)
HGB BLD-MCNC: 13.8 G/DL — SIGNIFICANT CHANGE UP (ref 13–17)
MCHC RBC-ENTMCNC: 30.8 PG — SIGNIFICANT CHANGE UP (ref 27–34)
MCHC RBC-ENTMCNC: 34.8 G/DL — SIGNIFICANT CHANGE UP (ref 32–36)
MCV RBC AUTO: 88.4 FL — SIGNIFICANT CHANGE UP (ref 80–100)
NRBC # BLD: 0 /100 WBCS — SIGNIFICANT CHANGE UP (ref 0–0)
OSMOLALITY UR: 617 MOS/KG — SIGNIFICANT CHANGE UP (ref 50–1200)
PLATELET # BLD AUTO: 212 K/UL — SIGNIFICANT CHANGE UP (ref 150–400)
POTASSIUM SERPL-MCNC: 3.5 MMOL/L — SIGNIFICANT CHANGE UP (ref 3.5–5.3)
POTASSIUM SERPL-SCNC: 3.5 MMOL/L — SIGNIFICANT CHANGE UP (ref 3.5–5.3)
RBC # BLD: 4.48 M/UL — SIGNIFICANT CHANGE UP (ref 4.2–5.8)
RBC # FLD: 12.4 % — SIGNIFICANT CHANGE UP (ref 10.3–14.5)
SODIUM SERPL-SCNC: 141 MMOL/L — SIGNIFICANT CHANGE UP (ref 135–145)
WBC # BLD: 9.38 K/UL — SIGNIFICANT CHANGE UP (ref 3.8–10.5)
WBC # FLD AUTO: 9.38 K/UL — SIGNIFICANT CHANGE UP (ref 3.8–10.5)

## 2024-12-29 RX ADMIN — TAMSULOSIN HYDROCHLORIDE 0.4 MILLIGRAM(S): 0.4 CAPSULE ORAL at 22:08

## 2024-12-29 RX ADMIN — Medication 5 MILLIGRAM(S): at 12:14

## 2024-12-29 RX ADMIN — SENNOSIDES 2 TABLET(S): 8.6 TABLET, FILM COATED ORAL at 22:07

## 2024-12-29 RX ADMIN — CHLORHEXIDINE GLUCONATE 1 APPLICATION(S): 1.2 RINSE ORAL at 06:23

## 2024-12-29 RX ADMIN — ENOXAPARIN SODIUM 40 MILLIGRAM(S): 60 INJECTION INTRAVENOUS; SUBCUTANEOUS at 01:16

## 2024-12-29 NOTE — DISCHARGE NOTE PROVIDER - NSDCMRMEDTOKEN_GEN_ALL_CORE_FT
acetaminophen 325 mg oral tablet: 2 tab(s) orally every 6 hours as needed for  mild pain  chlorhexidine 4% topical liquid: Apply topically to affected area 2 times a day please use to clean your ceballos twice a day  finasteride 5 mg oral tablet: 1 tab(s) orally once a day  hydrocortisone 1% topical cream: Apply topically to affected area once a day  senna leaf extract oral tablet: 2 tab(s) orally once a day (at bedtime)  tamsulosin 0.4 mg oral capsule: 1 cap(s) orally once a day (at bedtime)

## 2024-12-29 NOTE — DISCHARGE NOTE PROVIDER - NSDCFUADDAPPT_GEN_ALL_CORE_FT
Outpt f/u with Dr. Khan upon discharge for prostate management  [facility] Phone: [phone] [phone]

## 2024-12-29 NOTE — DISCHARGE NOTE PROVIDER - NSDCCPCAREPLAN_GEN_ALL_CORE_FT
PRINCIPAL DISCHARGE DIAGNOSIS  Diagnosis: Acute urinary retention  Assessment and Plan of Treatment: You came in unable to pee urine.  A ceballos catheter was placed in the emergency room and urine was able to drain.  You were seen by Urology and started on prostate medicine and recommended to keep the ceballos in for at least 1 week.  You were discharged home with the ceballos.  CT scan of the abdomen and pelvis showed an enlarged prostate.  Urology recommended to follow-up in the office for further management of the prostate.      SECONDARY DISCHARGE DIAGNOSES  Diagnosis: Postobstructive diuresis  Assessment and Plan of Treatment: After they Ceballos catheter was placed it drained a lot of urine.  You were given intravenous fluids to prevent you from getting dehydrated.  You were seen by the Nephrologist and your kidney function remained stable.    Diagnosis: Back pain  Assessment and Plan of Treatment:     Diagnosis: Acute urinary retention  Assessment and Plan of Treatment:      PRINCIPAL DISCHARGE DIAGNOSIS  Diagnosis: BPH with urinary obstruction  Assessment and Plan of Treatment: you came into the hospital due to difficulty urinating  you had a CT abdomen and pelvis that showed enlarged prostate Enlarged prostate measuring 6.0 x 6.2 x 5.7 cm  you had a ceballos catheter placed on 12/25  continue taking flomax + finasteride faily   please follow up with Urologist Dr. Khan on 1/2/24 at 4:30 PM  please bring your insurance and this discharge packet  How to take care of your Ceballos catheter  To take care of your Ceballos catheter, you will need to:  Clean your catheter every day.  Drink 1 to 2 glasses of liquids every 2 hours while you’re awake to keep you hydrated.  YOU MAY SHOWER, CLAMP THE TUBE ON THE TOP  DISCONNECT THE BAG  DURING THE DAY TIME IF YOU ARE ACTIVE YOU SHOULD USE THE LEG BAG  DURING THE NIGHT TIME YOU SHOULD USE THE LARGE URINE BAG   CLEANING:  Clean your hands with soap and water   Using mild soap and water, clean your penis  Clean the ceballos tube with the Chlorhexidine twice a day  Clean the catheter from where it enters your body and then down, away from your body. Hold the catheter at the point it enters your body so that you do not put tension on it.  Rinse the area well and dry it gently  For your penis, pull back your foreskin (the skin around the tip of your penis), if needed. Clean the area, including your penis.      SECONDARY DISCHARGE DIAGNOSES  Diagnosis: Psoriasis  Assessment and Plan of Treatment: continue applying hydrocortisone cream daily to your abdominal area    Diagnosis: Prediabetes  Assessment and Plan of Treatment: you are prediabetic   your hemoglobin a1c was 5.8%  Make sure you get your HgA1c checked every three months.  try to limit your sugar intake  follow up with your Primary Care Doctor    Diagnosis: Electrolyte imbalance  Assessment and Plan of Treatment: during your hospitalization you were found to have low sodium, potassium and phosphorus levels likely due to large recent urine output in the ceballos.   you were given electrolyte supplements and improved.   follow up with your Primary Care Doctor.

## 2024-12-29 NOTE — DISCHARGE NOTE PROVIDER - PROVIDER TOKENS
PROVIDER:[TOKEN:[[id number]],FOLLOWUP:[1 week]] PROVIDER:[TOKEN:[[id number]],SCHEDULEDAPPT:[01/02/2025],SCHEDULEDAPPTTIME:[04:30 PM]]

## 2024-12-29 NOTE — DISCHARGE NOTE PROVIDER - HOSPITAL COURSE
INCOMPLETE    58y/M, with h/o Psoriasis and chronic back pain presented to ED after unable to urinate since today morning. Also endorses back pain, which started today after he was unable to urinate. Denies abdominal discomfort or pain, denies fever and chills, weakness or numbness in extremities, denies trauma or use of any medications. Denies any history of prostate pathology, DM, HTN, HLD. States this is the first time this has happened to him. Mentions he has constipation, his last bm was yesterday,    In the ED: Temp 98.8, HR 78, /85, RR 18, Pulseox 95% on RA. 7/10 back pain. Ceballos was placed in ED, he had >3000cc of urine outpt in 4hrs. Mentions his back pain improved after ceballos cath. Admitted for urologic evaluation and post-obstructive diuresis    #Urinary Retention  Seen by Urology  Started on flomax and proscar  Ceballos was maintained and rec to keep for at least 1 week  Outpatient f/u with Urology for further management  CT A/P showed enlarged prostate measuring 6.0x6.2x5.7cm, no hydronephrosis, bladder collapsed around ceballos    #Post Obstructive Diuresis  Maintained with IV fluids  Seen by Nephrology  Kidney function remained stable 58y/M, with h/o Psoriasis and chronic back pain presented to ED after unable to urinate since today morning. Also endorses back pain, which started today after he was unable to urinate. Denies abdominal discomfort or pain, denies fever and chills, weakness or numbness in extremities, denies trauma or use of any medications. Denies any history of prostate pathology, DM, HTN, HLD. States this is the first time this has happened to him. Mentions he has constipation, his last bm was yesterday,    In the ED: Temp 98.8, HR 78, /85, RR 18, Pulseox 95% on RA. 7/10 back pain. Ceballos was placed in ED, he had >3000cc of urine outpt in 4hrs. Mentions his back pain improved after ceballos cath. Admitted for urologic evaluation and post-obstructive diuresis    #Urinary Retention  Seen by Urology  Started on flomax and proscar  Ceballos was maintained and rec to keep for at least 1 week  Outpatient f/u with Urology for further management  CT A/P showed enlarged prostate measuring 6.0x6.2x5.7cm, no hydronephrosis, bladder collapsed around ceballos    #Post Obstructive Diuresis  Maintained with IV fluids  Seen by Nephrology  Kidney function remained stable    Pt is urinating well in ceballos, RN provided ceballos education.   Appointment made for Urology Dr. Khan on 1/2 at 430pm for TOV.   Pt is now medically optimized for discharge.

## 2024-12-30 VITALS
TEMPERATURE: 98 F | DIASTOLIC BLOOD PRESSURE: 92 MMHG | OXYGEN SATURATION: 96 % | RESPIRATION RATE: 18 BRPM | HEART RATE: 94 BPM | SYSTOLIC BLOOD PRESSURE: 142 MMHG

## 2024-12-30 LAB
ANION GAP SERPL CALC-SCNC: 4 MMOL/L — LOW (ref 5–17)
BUN SERPL-MCNC: 16 MG/DL — SIGNIFICANT CHANGE UP (ref 7–18)
CALCIUM SERPL-MCNC: 8.9 MG/DL — SIGNIFICANT CHANGE UP (ref 8.4–10.5)
CHLORIDE SERPL-SCNC: 109 MMOL/L — HIGH (ref 96–108)
CO2 SERPL-SCNC: 28 MMOL/L — SIGNIFICANT CHANGE UP (ref 22–31)
CREAT SERPL-MCNC: 0.76 MG/DL — SIGNIFICANT CHANGE UP (ref 0.5–1.3)
EGFR: 103 ML/MIN/1.73M2 — SIGNIFICANT CHANGE UP
GLUCOSE BLDC GLUCOMTR-MCNC: 86 MG/DL — SIGNIFICANT CHANGE UP (ref 70–99)
GLUCOSE SERPL-MCNC: 117 MG/DL — HIGH (ref 70–99)
HCT VFR BLD CALC: 40.4 % — SIGNIFICANT CHANGE UP (ref 39–50)
HGB BLD-MCNC: 13.6 G/DL — SIGNIFICANT CHANGE UP (ref 13–17)
MCHC RBC-ENTMCNC: 30.2 PG — SIGNIFICANT CHANGE UP (ref 27–34)
MCHC RBC-ENTMCNC: 33.7 G/DL — SIGNIFICANT CHANGE UP (ref 32–36)
MCV RBC AUTO: 89.6 FL — SIGNIFICANT CHANGE UP (ref 80–100)
NRBC # BLD: 0 /100 WBCS — SIGNIFICANT CHANGE UP (ref 0–0)
PLATELET # BLD AUTO: 209 K/UL — SIGNIFICANT CHANGE UP (ref 150–400)
POTASSIUM SERPL-MCNC: 3.4 MMOL/L — LOW (ref 3.5–5.3)
POTASSIUM SERPL-SCNC: 3.4 MMOL/L — LOW (ref 3.5–5.3)
RBC # BLD: 4.51 M/UL — SIGNIFICANT CHANGE UP (ref 4.2–5.8)
RBC # FLD: 12.6 % — SIGNIFICANT CHANGE UP (ref 10.3–14.5)
SODIUM SERPL-SCNC: 141 MMOL/L — SIGNIFICANT CHANGE UP (ref 135–145)
WBC # BLD: 8.66 K/UL — SIGNIFICANT CHANGE UP (ref 3.8–10.5)
WBC # FLD AUTO: 8.66 K/UL — SIGNIFICANT CHANGE UP (ref 3.8–10.5)

## 2024-12-30 PROCEDURE — 84300 ASSAY OF URINE SODIUM: CPT

## 2024-12-30 PROCEDURE — 84133 ASSAY OF URINE POTASSIUM: CPT

## 2024-12-30 PROCEDURE — 99285 EMERGENCY DEPT VISIT HI MDM: CPT | Mod: 25

## 2024-12-30 PROCEDURE — 80053 COMPREHEN METABOLIC PANEL: CPT

## 2024-12-30 PROCEDURE — 83036 HEMOGLOBIN GLYCOSYLATED A1C: CPT

## 2024-12-30 PROCEDURE — 84100 ASSAY OF PHOSPHORUS: CPT

## 2024-12-30 PROCEDURE — 84443 ASSAY THYROID STIM HORMONE: CPT

## 2024-12-30 PROCEDURE — 82962 GLUCOSE BLOOD TEST: CPT

## 2024-12-30 PROCEDURE — 36415 COLL VENOUS BLD VENIPUNCTURE: CPT

## 2024-12-30 PROCEDURE — 80061 LIPID PANEL: CPT

## 2024-12-30 PROCEDURE — 84156 ASSAY OF PROTEIN URINE: CPT

## 2024-12-30 PROCEDURE — 84540 ASSAY OF URINE/UREA-N: CPT

## 2024-12-30 PROCEDURE — 81001 URINALYSIS AUTO W/SCOPE: CPT

## 2024-12-30 PROCEDURE — 85027 COMPLETE CBC AUTOMATED: CPT

## 2024-12-30 PROCEDURE — 83935 ASSAY OF URINE OSMOLALITY: CPT

## 2024-12-30 PROCEDURE — 74176 CT ABD & PELVIS W/O CONTRAST: CPT | Mod: MC

## 2024-12-30 PROCEDURE — 80048 BASIC METABOLIC PNL TOTAL CA: CPT

## 2024-12-30 PROCEDURE — 82570 ASSAY OF URINE CREATININE: CPT

## 2024-12-30 PROCEDURE — 83735 ASSAY OF MAGNESIUM: CPT

## 2024-12-30 PROCEDURE — 85025 COMPLETE CBC W/AUTO DIFF WBC: CPT

## 2024-12-30 RX ORDER — CHLORHEXIDINE GLUCONATE 1.2 MG/ML
1 RINSE ORAL DAILY
Refills: 0 | Status: DISCONTINUED | OUTPATIENT
Start: 2024-12-30 | End: 2024-12-30

## 2024-12-30 RX ORDER — TAMSULOSIN HYDROCHLORIDE 0.4 MG/1
1 CAPSULE ORAL
Qty: 90 | Refills: 0
Start: 2024-12-30 | End: 2025-03-29

## 2024-12-30 RX ORDER — ACETAMINOPHEN 80 MG/.8ML
2 SOLUTION/ DROPS ORAL
Qty: 40 | Refills: 0
Start: 2024-12-30 | End: 2025-01-03

## 2024-12-30 RX ORDER — FINASTERIDE 5 MG
1 TABLET ORAL
Qty: 90 | Refills: 0
Start: 2024-12-30 | End: 2025-03-29

## 2024-12-30 RX ORDER — POTASSIUM CHLORIDE 600 MG/1
20 TABLET, FILM COATED, EXTENDED RELEASE ORAL ONCE
Refills: 0 | Status: COMPLETED | OUTPATIENT
Start: 2024-12-30 | End: 2024-12-30

## 2024-12-30 RX ORDER — POTASSIUM CHLORIDE 600 MG/1
20 TABLET, FILM COATED, EXTENDED RELEASE ORAL DAILY
Refills: 0 | Status: DISCONTINUED | OUTPATIENT
Start: 2024-12-30 | End: 2024-12-30

## 2024-12-30 RX ORDER — HYDROCORTISONE 1 %
1 CREAM (GRAM) TOPICAL
Qty: 1 | Refills: 0
Start: 2024-12-30 | End: 2025-01-28

## 2024-12-30 RX ORDER — SENNOSIDES 8.6 MG/1
2 TABLET, FILM COATED ORAL
Qty: 10 | Refills: 0
Start: 2024-12-30 | End: 2025-01-03

## 2024-12-30 RX ORDER — CHLORHEXIDINE GLUCONATE 1.2 MG/ML
1 RINSE ORAL
Qty: 1 | Refills: 0
Start: 2024-12-30 | End: 2025-01-03

## 2024-12-30 RX ADMIN — CHLORHEXIDINE GLUCONATE 1 APPLICATION(S): 1.2 RINSE ORAL at 12:27

## 2024-12-30 RX ADMIN — Medication 5 MILLIGRAM(S): at 12:27

## 2024-12-30 RX ADMIN — ENOXAPARIN SODIUM 40 MILLIGRAM(S): 60 INJECTION INTRAVENOUS; SUBCUTANEOUS at 02:37

## 2024-12-30 RX ADMIN — CHLORHEXIDINE GLUCONATE 1 APPLICATION(S): 1.2 RINSE ORAL at 06:57

## 2024-12-30 RX ADMIN — POTASSIUM CHLORIDE 20 MILLIEQUIVALENT(S): 600 TABLET, FILM COATED, EXTENDED RELEASE ORAL at 08:44

## 2024-12-30 NOTE — PROGRESS NOTE ADULT - PROVIDER SPECIALTY LIST ADULT
Internal Medicine
Nephrology
Internal Medicine
Nephrology

## 2024-12-30 NOTE — PROGRESS NOTE ADULT - ASSESSMENT
1. Urinary retention due to BPH  CTAP shows no hydro.   -s/p ceballos's cath and drainage well  -decreased UO and post obsturctive diuresis is less. No need fluids.   -continue flomax and finasteride  -f/u urology as outpatient  2. Hypokalemia:  -give kcl 40meq once  -monitor K  3. Hyponatermia:  -improved to 142  -monitor Na  
Post Obstructive Diuresis:  S/P obstruction with large prostate.  Urine output has now improved.  - Monitor urine output.  - Hold IVF.  - Monitor BMP.  - If UO is > 100 ml/hour, resume 1/2 NS at 1/3 to 1/2 rate of urine flow.
58y/M, with h/o Psoriasis and chronic back pain presented to ED after unable to urinate since today morning. S/p ceballos catheterisation in ED, pt had UO of  >3000cc within 4hrs . Admitted for post obstructive diuresis, Urology and Nephrology consulted.
58y/M, with h/o Psoriasis and chronic back pain presented to ED after unable to urinate since today morning. S/p ceballos catheterisation in ED, pt had UO of  >3000cc within 4hrs . Admitted for post obstructive diuresis.     Pt needs med rec. uses topical ointment for psoriasis. does not remember name

## 2024-12-30 NOTE — PROGRESS NOTE ADULT - SUBJECTIVE AND OBJECTIVE BOX
AMEYA IRELAND  MR# [id number]  58yMale        Patient is a 58y old  Male who presents with a chief complaint of Urinary retention (26 Dec 2024 13:29)      INTERVAL HPI/OVERNIGHT EVENTS:  Patient seen and examined at bedside. No notations of chest pain, palpitation, SOB, orthopnea, nausea, vomiting or abdominal pain.    ALLERGIES  No Known Allergies      MEDICATIONS  acetaminophen     Tablet .. 650 milliGRAM(s) Oral every 6 hours PRN Temp greater or equal to 38C (100.4F), Mild Pain (1 - 3)  bisacodyl 10 milliGRAM(s) Oral once  enoxaparin Injectable 40 milliGRAM(s) SubCutaneous every 24 hours  finasteride 5 milliGRAM(s) Oral daily  influenza   Vaccine 0.5 milliLiter(s) IntraMuscular once  polyethylene glycol 3350 17 Gram(s) Oral two times a day PRN Constipation  senna 2 Tablet(s) Oral at bedtime  sodium chloride 0.9%. 1000 milliLiter(s) IV Continuous <Continuous>  tamsulosin 0.4 milliGRAM(s) Oral at bedtime              REVIEW OF SYSTEMS:  CONSTITUTIONAL: No fever, weight loss, or fatigue  EYES: No eye pain, visual disturbances, or discharge  ENT:  No difficulty hearing, tinnitus, vertigo; No sinus or throat pain  NECK: No pain or stiffness  RESPIRATORY: No cough, wheezing, chills or hemoptysis; No Shortness of Breath  CARDIOVASCULAR: No chest pain, palpitations, passing out, dizziness, or leg swelling  GASTROINTESTINAL: No abdominal or epigastric pain. No nausea, vomiting, or hematemesis; No diarrhea or constipation. No melena or hematochezia.  GENITOURINARY: No dysuria, frequency, hematuria, or incontinence  NEUROLOGICAL: No headaches, memory loss, loss of strength, numbness, or tremors  SKIN: No itching, burning, rashes, or lesions   LYMPH Nodes: No enlarged glands  ENDOCRINE: No heat or cold intolerance; No hair loss  MUSCULOSKELETAL: No joint pain or swelling; No muscle, back, or extremity pain  PSYCHIATRIC: No depression, anxiety, mood swings, or difficulty sleeping  HEME/LYMPH: No easy bruising, or bleeding gums  ALLERGY AND IMMUNOLOGIC: No hives or eczema	    [ ] All others negative	  [ ] Unable to obtain      T(C): 36.7 (12-27-24 @ 06:23), Max: 36.7 (12-26-24 @ 19:13)  T(F): 98.1 (12-27-24 @ 06:23), Max: 98.1 (12-26-24 @ 19:13)  HR: 69 (12-27-24 @ 06:23) (69 - 80)  BP: 137/81 (12-27-24 @ 06:23) (137/81 - 143/84)  RR: 18 (12-27-24 @ 06:23) (18 - 18)  SpO2: 96% (12-27-24 @ 06:23) (95% - 96%)  Wt(kg): --    I&O's Summary    26 Dec 2024 07:01  -  27 Dec 2024 07:00  --------------------------------------------------------  IN: 0 mL / OUT: 5250 mL / NET: -5250 mL          PHYSICAL EXAM:  A X O x  HEAD:  Atraumatic, Normocephalic  EYES: EOMI, PERRLA, conjunctiva and sclera clear  NECK: Supple, No JVD, Normal thyroid  Resp: CTAB, No crackles, wheezing,   CVS: Regular rate and rhythm; No discernable murmurs, rubs, or gallops  ABD: Soft, Nontender, Nondistended; Bowel sounds present  EXTREMITIES:  2+ Peripheral Pulses, No edema  LYMPH: No dicernable lymphadenopathy noted  GENERAL: NAD, well-groomed, well-developed      LABS:                        14.0   8.85  )-----------( 196      ( 27 Dec 2024 06:07 )             40.5     12-27    140  |  109[H]  |  13  ----------------------------<  107[H]  3.5   |  26  |  0.72    Ca    8.9      27 Dec 2024 06:07  Phos  2.8     12-27  Mg     2.1     12-26    TPro  6.0  /  Alb  3.1[L]  /  TBili  0.5  /  DBili  x   /  AST  18  /  ALT  20  /  AlkPhos  74  12-26      Urinalysis Basic - ( 27 Dec 2024 06:07 )    Color: x / Appearance: x / SG: x / pH: x  Gluc: 107 mg/dL / Ketone: x  / Bili: x / Urobili: x   Blood: x / Protein: x / Nitrite: x   Leuk Esterase: x / RBC: x / WBC x   Sq Epi: x / Non Sq Epi: x / Bacteria: x      CAPILLARY BLOOD GLUCOSE          Troponins:  ProBNP:  Lipid Profile:   HgA1c:  TSH:           RADIOLOGY & ADDITIONAL TESTS:    Imaging Personally Reviewed:  [ ] YES  [ ] NO      Consultant(s) Notes Reviewed:  [x ] YES  [ ] NO    Care Discussed with Consultants/Other Providers [ x] YES  [ ] NO          PAST MEDICAL & SURGICAL HISTORY:  Obesity      History of 2019 novel coronavirus disease (COVID-19)      Psoriasis      No significant past surgical history            Other polyuria    No pertinent family history in first degree relatives    Handoff    MEWS Score    No pertinent past medical history    No pertinent past medical history    Obesity    History of 2019 novel coronavirus disease (COVID-19)    Psoriasis    Acute urinary retention    Postobstructive diuresis    Urinary retention    Postobstructive diuresis    Psoriasis    Prophylactic measure    No significant past surgical history    A - UTI    90+    Back pain    Acute urinary retention    [email or web address]            
NEPHROLOGY MEDICAL CARE, [facility] - Dr. Jorge Zacarias/ Dr. Sahil Taylor/ Dr. Raz Hernández/ Dr. Suzie Bridges    Date of Service: 12-30-24    Patient was seen and examined at bedside.    CC: patient is okay and NAD    Vital Signs Last 24 Hrs  T(C): 36.7 (30 Dec 2024 12:24), Max: 36.7 (29 Dec 2024 20:42)  T(F): 98.1 (30 Dec 2024 12:24), Max: 98.1 (29 Dec 2024 20:42)  HR: 94 (30 Dec 2024 12:24) (71 - 94)  BP: 142/92 (30 Dec 2024 12:24) (119/73 - 142/92)  BP(mean): --  RR: 18 (30 Dec 2024 12:24) (17 - 18)  SpO2: 96% (30 Dec 2024 12:24) (95% - 97%)    Parameters below as of 30 Dec 2024 12:24  Patient On (Oxygen Delivery Method): room air        12-29 @ 07:01  -  12-30 @ 07:00  --------------------------------------------------------  IN: 0 mL / OUT: 1700 mL / NET: -1700 mL        PHYSICAL EXAM:  GENERAL: No acute respiratory distress.  HEAD:  Atraumatic, Normocephalic  EYES: conjunctiva and sclera clear  ENMT: Moist mucous membranes  NECK: Supple, No JVD  NERVOUS SYSTEM:  Awake, Alert & Oriented X3, No focal deficits present.   CHEST/LUNG: Clear to percussion bilaterally; No rales, rhonchi, wheezing  HEART: Regular rate and rhythm; No murmurs, rubs, or gallops  ABDOMEN: Soft, Non-tender, Nondistended; Bowel sounds present  : ceballos's cath with clear urine.   EXTREMITIES: No cyanosis, or edema  SKIN: No rashes or lesions      MEDICATIONS:  MEDICATIONS  (STANDING):  chlorhexidine 2% Cloths 1 Application(s) Topical daily  enoxaparin Injectable 40 milliGRAM(s) SubCutaneous every 24 hours  finasteride 5 milliGRAM(s) Oral daily  influenza   Vaccine 0.5 milliLiter(s) IntraMuscular once  senna 2 Tablet(s) Oral at bedtime  tamsulosin 0.4 milliGRAM(s) Oral at bedtime    MEDICATIONS  (PRN):  acetaminophen     Tablet .. 650 milliGRAM(s) Oral every 6 hours PRN Temp greater or equal to 38C (100.4F), Mild Pain (1 - 3)  polyethylene glycol 3350 17 Gram(s) Oral two times a day PRN Constipation          LABS:                        13.6   8.66  )-----------( 209      ( 30 Dec 2024 05:50 )             40.4     12-30    141  |  109[H]  |  16  ----------------------------<  117[H]  3.4[L]   |  28  |  0.76    Ca    8.9      30 Dec 2024 05:50        Urinalysis Basic - ( 30 Dec 2024 05:50 )    Color: x / Appearance: x / SG: x / pH: x  Gluc: 117 mg/dL / Ketone: x  / Bili: x / Urobili: x   Blood: x / Protein: x / Nitrite: x   Leuk Esterase: x / RBC: x / WBC x   Sq Epi: x / Non Sq Epi: x / Bacteria: x        Urine studies  Osmolality, Random Urine: 617 mos/kg (12-29 @ 08:39)    PTH and Vit D:        
NP Note discussed with  Primary Attending    Patient is a 58y old  Male who presents with a chief complaint of Urinary retention (27 Dec 2024 13:15)      INTERVAL HPI/OVERNIGHT EVENTS: no acute events overnight     MEDICATIONS  (STANDING):  chlorhexidine 2% Cloths 1 Application(s) Topical <User Schedule>  enoxaparin Injectable 40 milliGRAM(s) SubCutaneous every 24 hours  finasteride 5 milliGRAM(s) Oral daily  influenza   Vaccine 0.5 milliLiter(s) IntraMuscular once  senna 2 Tablet(s) Oral at bedtime  sodium chloride 0.9%. 1000 milliLiter(s) (200 mL/Hr) IV Continuous <Continuous>  tamsulosin 0.4 milliGRAM(s) Oral at bedtime    MEDICATIONS  (PRN):  acetaminophen     Tablet .. 650 milliGRAM(s) Oral every 6 hours PRN Temp greater or equal to 38C (100.4F), Mild Pain (1 - 3)  polyethylene glycol 3350 17 Gram(s) Oral two times a day PRN Constipation      __________________________________________________  REVIEW OF SYSTEMS:    CONSTITUTIONAL: No fever,   EYES: no acute visual disturbances  NECK: No pain or stiffness  RESPIRATORY: No cough; No shortness of breath  CARDIOVASCULAR: No chest pain, no palpitations  GASTROINTESTINAL: No pain. No nausea or vomiting; No diarrhea   NEUROLOGICAL: No headache or numbness, no tremors  MUSCULOSKELETAL: No joint pain, no muscle pain  GENITOURINARY: no dysuria, no frequency, no hesitancy  PSYCHIATRY: no depression , no anxiety  ALL OTHER  ROS negative        Vital Signs Last 24 Hrs  T(C): 36.6 (27 Dec 2024 14:07), Max: 36.7 (26 Dec 2024 19:13)  T(F): 97.8 (27 Dec 2024 14:07), Max: 98.1 (26 Dec 2024 19:13)  HR: 84 (27 Dec 2024 14:07) (69 - 84)  BP: 152/86 (27 Dec 2024 14:07) (137/81 - 152/86)  BP(mean): 100 (27 Dec 2024 06:23) (100 - 100)  RR: 17 (27 Dec 2024 14:07) (17 - 18)  SpO2: 96% (27 Dec 2024 14:07) (95% - 96%)    Parameters below as of 27 Dec 2024 14:07  Patient On (Oxygen Delivery Method): room air        ________________________________________________  PHYSICAL EXAM:  GENERAL: NAD  HEENT: Normocephalic   NECK : supple  CHEST/LUNG: Clear to auscultation bilaterally   HEART: S1 S2  regular  ABDOMEN: Soft, Nontender, Nondistended; Bowel sounds present  EXTREMITIES: no edema  SKIN: warm and dry; no rash  NERVOUS SYSTEM:  Awake and alert; Oriented  to place, person and time    _________________________________________________  LABS:                        14.0   8.85  )-----------( 196      ( 27 Dec 2024 06:07 )             40.5     12-    140  |  109[H]  |  13  ----------------------------<  107[H]  3.5   |  26  |  0.72    Ca    8.9      27 Dec 2024 06:07  Phos  2.8     12  Mg     2.1         TPro  6.0  /  Alb  3.1[L]  /  TBili  0.5  /  DBili  x   /  AST  18  /  ALT  20  /  AlkPhos  74  12-      Urinalysis Basic - ( 27 Dec 2024 13:01 )    Color: Yellow / Appearance: Clear / S.020 / pH: x  Gluc: x / Ketone: Negative mg/dL  / Bili: Negative / Urobili: 1.0 mg/dL   Blood: x / Protein: 100 mg/dL / Nitrite: Negative   Leuk Esterase: Small / RBC: 25 /HPF / WBC 5 /HPF   Sq Epi: x / Non Sq Epi: x / Bacteria: Few /HPF      CAPILLARY BLOOD GLUCOSE                Imaging Personally Reviewed:  YES    Consultant(s) Notes Reviewed:   YES      Plan of care was discussed with patient and /or primary care giver; all questions and concerns were addressed and care was aligned with patient's wishes.    
INTERVAL HPI/OVERNIGHT EVENTS:  Patient seen,no acute issues,stable clinically  VITAL SIGNS:  T(F): 97.5 (24 @ 05:26)  HR: 74 (24 @ 05:26)  BP: 114/75 (24 @ 05:26)  RR: 18 (24 @ 05:26)  SpO2: 95% (24 @ 05:26)  Wt(kg): --    PHYSICAL EXAM:  awake  Constitutional:  Eyes:  ENMT:perrla  Neck:  Respiratory:clear  Cardiovascular:s1s2,m-none  Gastrointestinal:soft,bs pos,ceballos in place  Extremities:  Vascular:  Neurological:no focal deficit  Musculoskeletal:    MEDICATIONS  (STANDING):  chlorhexidine 2% Cloths 1 Application(s) Topical <User Schedule>  enoxaparin Injectable 40 milliGRAM(s) SubCutaneous every 24 hours  finasteride 5 milliGRAM(s) Oral daily  influenza   Vaccine 0.5 milliLiter(s) IntraMuscular once  senna 2 Tablet(s) Oral at bedtime  sodium chloride 0.9%. 1000 milliLiter(s) (200 mL/Hr) IV Continuous <Continuous>  tamsulosin 0.4 milliGRAM(s) Oral at bedtime    MEDICATIONS  (PRN):  acetaminophen     Tablet .. 650 milliGRAM(s) Oral every 6 hours PRN Temp greater or equal to 38C (100.4F), Mild Pain (1 - 3)  polyethylene glycol 3350 17 Gram(s) Oral two times a day PRN Constipation      Allergies    No Known Allergies    Intolerances        LABS:                        14.0   8.85  )-----------( 196      ( 27 Dec 2024 06:07 )             40.5         140  |  109[H]  |  13  ----------------------------<  107[H]  3.5   |  26  |  0.72    Ca    8.9      27 Dec 2024 06:07  Phos  2.8             Urinalysis Basic - ( 27 Dec 2024 13:01 )    Color: Yellow / Appearance: Clear / S.020 / pH: x  Gluc: x / Ketone: Negative mg/dL  / Bili: Negative / Urobili: 1.0 mg/dL   Blood: x / Protein: 100 mg/dL / Nitrite: Negative   Leuk Esterase: Small / RBC: 25 /HPF / WBC 5 /HPF   Sq Epi: x / Non Sq Epi: x / Bacteria: Few /HPF        RADIOLOGY & ADDITIONAL TESTS:      Assessment and Plan:   · Assessment	  58y/M, with h/o Psoriasis and chronic back pain presented to ED after unable to urinate since today morning. S/p ceballos catheterisation in ED, pt had UO of  >3000cc within 4hrs . Admitted for post obstructive diuresis, Urology and Nephrology consulted.         Problem/Plan - 1:  ·  Problem: Postobstructive diuresis.   concern for postobstructive diuresis  -strict I&O  -started IVF at 200cc/hr  -replete electrolytes as needed  - repeat U/A on  showed large protein in urine --> Nephro consulted.     Problem/Plan - 2:  ·  Problem: Urinary retention.   denies use of any meds  denies dx of prostate pathology  -started tamsulosin and proscar  -patient to remain with ceballos x 1 week per Urology and f/u outpatient  -rest of the plan as above.     Problem/Plan - 3:  ·  Problem: Psoriasis.   ·  Plan: has h/o psoriasis on topical medications at home.     Problem/Plan - 4:  ·  Problem: Prophylactic measure.   ·  Plan: DVT prop: lovenox.     Problem/Plan - 5:  ·  Problem: Discharge planning issues.   ·  Plan: from home independent  ceballos to remain in place  pending CT A/P  pending Nephro reccs.  
INTERVAL HPI/OVERNIGHT EVENTS:  Patient seen,not in distress  VITAL SIGNS:  T(F): 97.8 (12-29-24 @ 05:15)  HR: 75 (12-29-24 @ 05:15)  BP: 126/87 (12-29-24 @ 05:15)  RR: 17 (12-29-24 @ 05:15)  SpO2: 95% (12-29-24 @ 05:15)  Wt(kg): --    PHYSICAL EXAM:  awake  Constitutional:  Eyes:  ENMT:perrla  Neck:  Respiratory:clear  Cardiovascular:s1s2,m-none  Gastrointestinal:soft,bs pos  Extremities:  Vascular:  Neurological:no focal deficit  Musculoskeletal:    MEDICATIONS  (STANDING):  chlorhexidine 2% Cloths 1 Application(s) Topical <User Schedule>  enoxaparin Injectable 40 milliGRAM(s) SubCutaneous every 24 hours  finasteride 5 milliGRAM(s) Oral daily  influenza   Vaccine 0.5 milliLiter(s) IntraMuscular once  senna 2 Tablet(s) Oral at bedtime  sodium chloride 0.9%. 1000 milliLiter(s) (80 mL/Hr) IV Continuous <Continuous>  tamsulosin 0.4 milliGRAM(s) Oral at bedtime    MEDICATIONS  (PRN):  acetaminophen     Tablet .. 650 milliGRAM(s) Oral every 6 hours PRN Temp greater or equal to 38C (100.4F), Mild Pain (1 - 3)  polyethylene glycol 3350 17 Gram(s) Oral two times a day PRN Constipation      Allergies    No Known Allergies    Intolerances        LABS:                        13.8   9.38  )-----------( 212      ( 29 Dec 2024 06:22 )             39.6     12-29    141  |  109[H]  |  15  ----------------------------<  100[H]  3.5   |  25  |  0.72    Ca    8.7      29 Dec 2024 06:22        Urinalysis Basic - ( 29 Dec 2024 06:22 )    Color: x / Appearance: x / SG: x / pH: x  Gluc: 100 mg/dL / Ketone: x  / Bili: x / Urobili: x   Blood: x / Protein: x / Nitrite: x   Leuk Esterase: x / RBC: x / WBC x   Sq Epi: x / Non Sq Epi: x / Bacteria: x        RADIOLOGY & ADDITIONAL TESTS:      Assessment and Plan:   · Assessment	  58y/M, with h/o Psoriasis and chronic back pain presented to ED after unable to urinate since today morning. S/p ceballos catheterisation in ED, pt had UO of  >3000cc within 4hrs . Admitted for post obstructive diuresis, Urology and Nephrology consulted.         Problem/Plan - 1:  ·  Problem: Postobstructive diuresis.   concern for postobstructive diuresis  -strict I&O  -replete electrolytes as needed  - repeat U/A on 12/27 showed large protein in urine --> Nephro consulted.     Problem/Plan - 2:  ·  Problem: Urinary retention.   denies use of any meds  denies dx of prostate pathology  -started tamsulosin and proscar  -patient to remain with ceballos x 1 week per Urology and f/u outpatient  -rest of the plan as above.     Problem/Plan - 3:  ·  Problem: Psoriasis.   ·  Plan: has h/o psoriasis on topical medications at home.     Problem/Plan - 4:  ·  Problem: Prophylactic measure.   ·  Plan: DVT prop: lovenox.     Problem/Plan - 5:  ·  Problem: Discharge planning issues.   ·  Plan: from home independent  ceballos to remain in place  pending CT A/P  pending Nephro reccs.      
AMEYA IRELAND  58y  Patient is a 58y old  Male who presents with a chief complaint of Urinary retention (29 Dec 2024 08:50)    HPI:  Followed for polyuria.  Urine output had normalized.    HEALTH ISSUES - PROBLEM Dx:  Urinary retention    Postobstructive diuresis    Psoriasis    Prophylactic measure    Discharge planning issues          MEDICATIONS  (STANDING):  chlorhexidine 2% Cloths 1 Application(s) Topical <User Schedule>  enoxaparin Injectable 40 milliGRAM(s) SubCutaneous every 24 hours  finasteride 5 milliGRAM(s) Oral daily  influenza   Vaccine 0.5 milliLiter(s) IntraMuscular once  senna 2 Tablet(s) Oral at bedtime  tamsulosin 0.4 milliGRAM(s) Oral at bedtime    MEDICATIONS  (PRN):  acetaminophen     Tablet .. 650 milliGRAM(s) Oral every 6 hours PRN Temp greater or equal to 38C (100.4F), Mild Pain (1 - 3)  polyethylene glycol 3350 17 Gram(s) Oral two times a day PRN Constipation    Vital Signs Last 24 Hrs  T(C): 36.6 (29 Dec 2024 05:15), Max: 37 (28 Dec 2024 20:35)  T(F): 97.8 (29 Dec 2024 05:15), Max: 98.6 (28 Dec 2024 20:35)  HR: 75 (29 Dec 2024 05:15) (72 - 75)  BP: 126/87 (29 Dec 2024 05:15) (126/87 - 155/87)  BP(mean): --  RR: 17 (29 Dec 2024 05:15) (17 - 17)  SpO2: 95% (29 Dec 2024 05:15) (95% - 96%)    Parameters below as of 29 Dec 2024 05:15  Patient On (Oxygen Delivery Method): room air      Daily     Daily     PHYSICAL EXAM:  Constitutional:   appears comfortable and not distressed. Not diaphoretic.    Neck:  The thyroid is normal. Trachea is midline.     Breasts: Normal examination.    Respiratory: The lungs are clear to auscultation. No dullness and expansion is normal.    Cardiovascular: S1 and S2 are normal. No mummurs, rubs or gallops are present.    Gastrointestinal: The abdomen is soft. No tenderness is present. No masses are present. Bowel sounds are normal.    Genitourinary: The bladder is not distended. No CVA tenderness is present.    Extremities: No edema is noted. No deformities are present.    Neurological: Cognition is normal. Tone, power and sensation are normal. Gait is steady.    Skin: No leasions are seen  or palpated.    Lymph Nodes: No lymphadenopathy is present.    Psychiatric: Mood is appropriate. No hallucinations or flight of ideas are noted.                              13.8   9.38  )-----------( 212      ( 29 Dec 2024 06:22 )             39.6     12-29    141  |  109[H]  |  15  ----------------------------<  100[H]  3.5   |  25  |  0.72    Ca    8.7      29 Dec 2024 06:22    < from: CT Abdomen and Pelvis No Cont (12.27.24 @ 20:13) >  Bibasilar groundglass opacities, improved from prior, either motion   artifact, pulmonary edema or pneumonia.    No hydronephrosis. Enlarged prostate. Bladder collapsed around Lyles   catheter.    Colonic diverticulosis without diverticulitis.    < end of copied text >  
AMEYA IRELAND  MR# [id number]  58yMale        Patient is a 58y old  Male who presents with a chief complaint of Urinary retention (25 Dec 2024 22:49)      INTERVAL HPI/OVERNIGHT EVENTS:  Patient seen and examined at bedside. No notations of chest pain, palpitation, SOB, orthopnea, nausea, vomiting or abdominal pain.    ALLERGIES  No Known Allergies      MEDICATIONS  acetaminophen     Tablet .. 650 milliGRAM(s) Oral every 6 hours PRN Temp greater or equal to 38C (100.4F), Mild Pain (1 - 3)  enoxaparin Injectable 40 milliGRAM(s) SubCutaneous every 24 hours  finasteride 5 milliGRAM(s) Oral daily  influenza   Vaccine 0.5 milliLiter(s) IntraMuscular once  polyethylene glycol 3350 17 Gram(s) Oral two times a day PRN Constipation  senna 2 Tablet(s) Oral at bedtime  sodium chloride 0.9%. 1000 milliLiter(s) IV Continuous <Continuous>  tamsulosin 0.4 milliGRAM(s) Oral at bedtime              REVIEW OF SYSTEMS:  CONSTITUTIONAL: No fever, weight loss, or fatigue  EYES: No eye pain, visual disturbances, or discharge  ENT:  No difficulty hearing, tinnitus, vertigo; No sinus or throat pain  NECK: No pain or stiffness  RESPIRATORY: No cough, wheezing, chills or hemoptysis; No Shortness of Breath  CARDIOVASCULAR: No chest pain, palpitations, passing out, dizziness, or leg swelling  GASTROINTESTINAL: No abdominal or epigastric pain. No nausea, vomiting, or hematemesis; No diarrhea or constipation. No melena or hematochezia.  GENITOURINARY: No dysuria, frequency, hematuria, or incontinence  NEUROLOGICAL: No headaches, memory loss, loss of strength, numbness, or tremors  SKIN: No itching, burning, rashes, or lesions   LYMPH Nodes: No enlarged glands  ENDOCRINE: No heat or cold intolerance; No hair loss  MUSCULOSKELETAL: No joint pain or swelling; No muscle, back, or extremity pain  PSYCHIATRIC: No depression, anxiety, mood swings, or difficulty sleeping  HEME/LYMPH: No easy bruising, or bleeding gums  ALLERGY AND IMMUNOLOGIC: No hives or eczema	    [ ] All others negative	  [ ] Unable to obtain      T(C): 36.5 (12-26-24 @ 11:30), Max: 37.1 (12-25-24 @ 18:02)  T(F): 97.7 (12-26-24 @ 11:30), Max: 98.8 (12-25-24 @ 18:02)  HR: 81 (12-26-24 @ 11:30) (61 - 81)  BP: 155/86 (12-26-24 @ 11:30) (122/70 - 155/86)  RR: 18 (12-26-24 @ 11:30) (16 - 19)  SpO2: 96% (12-26-24 @ 11:30) (95% - 98%)  Wt(kg): --  Height (cm): 165.1 (12-25 @ 18:02)  Weight (kg): 99.2 (12-25 @ 18:02)  BMI (kg/m2): 36.4 (12-25 @ 18:02)  BSA (m2): 2.05 (12-25 @ 18:02)  I&O's Summary    25 Dec 2024 07:01  -  26 Dec 2024 07:00  --------------------------------------------------------  IN: 0 mL / OUT: 5800 mL / NET: -5800 mL          PHYSICAL EXAM:  A X O x  HEAD:  Atraumatic, Normocephalic  EYES: EOMI, PERRLA, conjunctiva and sclera clear  NECK: Supple, No JVD, Normal thyroid  Resp: CTAB, No crackles, wheezing,   CVS: Regular rate and rhythm; No discernable murmurs, rubs, or gallops  ABD: Soft, Nontender, Nondistended; Bowel sounds present  EXTREMITIES:  2+ Peripheral Pulses, No edema  LYMPH: No dicernable lymphadenopathy noted  GENERAL: NAD, well-groomed, well-developed      LABS:                        13.4   8.37  )-----------( 196      ( 26 Dec 2024 08:10 )             38.6     12-26    140  |  111[H]  |  10  ----------------------------<  101[H]  3.7   |  27  |  0.72    Ca    8.3[L]      26 Dec 2024 08:10  Phos  1.9     12-26  Mg     2.1     12-26    TPro  6.0  /  Alb  3.1[L]  /  TBili  0.5  /  DBili  x   /  AST  18  /  ALT  20  /  AlkPhos  74  12-26      Urinalysis Basic - ( 26 Dec 2024 08:10 )    Color: x / Appearance: x / SG: x / pH: x  Gluc: 101 mg/dL / Ketone: x  / Bili: x / Urobili: x   Blood: x / Protein: x / Nitrite: x   Leuk Esterase: x / RBC: x / WBC x   Sq Epi: x / Non Sq Epi: x / Bacteria: x      CAPILLARY BLOOD GLUCOSE          Troponins:  ProBNP:  Lipid Profile:   HgA1c:  TSH:           RADIOLOGY & ADDITIONAL TESTS:    Imaging Personally Reviewed:  [ ] YES  [ ] NO      Consultant(s) Notes Reviewed:  [x ] YES  [ ] NO    Care Discussed with Consultants/Other Providers [ x] YES  [ ] NO          PAST MEDICAL & SURGICAL HISTORY:  Obesity      History of 2019 novel coronavirus disease (COVID-19)      Psoriasis      No significant past surgical history            Other polyuria    No pertinent family history in first degree relatives    Handoff    MEWS Score    No pertinent past medical history    No pertinent past medical history    Obesity    History of 2019 novel coronavirus disease (COVID-19)    Psoriasis    Acute urinary retention    Postobstructive diuresis    Urinary retention    Postobstructive diuresis    Psoriasis    Prophylactic measure    No significant past surgical history    A - UTI    90+    Back pain    Acute urinary retention    [email or web address]

## 2025-01-08 ENCOUNTER — EMERGENCY (EMERGENCY)
Facility: HOSPITAL | Age: 60
LOS: 1 days | Discharge: HOME | End: 2025-01-08
Attending: STUDENT IN AN ORGANIZED HEALTH CARE EDUCATION/TRAINING PROGRAM
Payer: COMMERCIAL

## 2025-01-08 VITALS
OXYGEN SATURATION: 96 % | RESPIRATION RATE: 17 BRPM | SYSTOLIC BLOOD PRESSURE: 146 MMHG | WEIGHT: 210.98 LBS | HEIGHT: 65 IN | DIASTOLIC BLOOD PRESSURE: 80 MMHG | HEART RATE: 90 BPM | TEMPERATURE: 98 F

## 2025-01-08 VITALS
OXYGEN SATURATION: 98 % | RESPIRATION RATE: 19 BRPM | TEMPERATURE: 98 F | SYSTOLIC BLOOD PRESSURE: 149 MMHG | DIASTOLIC BLOOD PRESSURE: 82 MMHG | HEART RATE: 62 BPM

## 2025-01-08 PROBLEM — L40.9 PSORIASIS, UNSPECIFIED: Chronic | Status: ACTIVE | Noted: 2024-12-25

## 2025-01-08 LAB
ANION GAP SERPL CALC-SCNC: 6 MMOL/L — SIGNIFICANT CHANGE UP (ref 5–17)
APPEARANCE UR: ABNORMAL
BACTERIA # UR AUTO: SIGNIFICANT CHANGE UP /HPF
BASOPHILS # BLD AUTO: 0.07 K/UL — SIGNIFICANT CHANGE UP (ref 0–0.2)
BASOPHILS NFR BLD AUTO: 0.6 % — SIGNIFICANT CHANGE UP (ref 0–2)
BILIRUB UR-MCNC: NEGATIVE — SIGNIFICANT CHANGE UP
BUN SERPL-MCNC: 13 MG/DL — SIGNIFICANT CHANGE UP (ref 7–18)
CALCIUM SERPL-MCNC: 8.9 MG/DL — SIGNIFICANT CHANGE UP (ref 8.4–10.5)
CHLORIDE SERPL-SCNC: 106 MMOL/L — SIGNIFICANT CHANGE UP (ref 96–108)
CO2 SERPL-SCNC: 27 MMOL/L — SIGNIFICANT CHANGE UP (ref 22–31)
COLOR SPEC: YELLOW — SIGNIFICANT CHANGE UP
COMMENT - URINE: SIGNIFICANT CHANGE UP
CREAT SERPL-MCNC: 0.86 MG/DL — SIGNIFICANT CHANGE UP (ref 0.5–1.3)
DIFF PNL FLD: ABNORMAL
EGFR: 99 ML/MIN/1.73M2 — SIGNIFICANT CHANGE UP
EOSINOPHIL # BLD AUTO: 0.13 K/UL — SIGNIFICANT CHANGE UP (ref 0–0.5)
EOSINOPHIL NFR BLD AUTO: 1.1 % — SIGNIFICANT CHANGE UP (ref 0–6)
EPI CELLS # UR: SIGNIFICANT CHANGE UP
GLUCOSE SERPL-MCNC: 105 MG/DL — HIGH (ref 70–99)
GLUCOSE UR QL: NEGATIVE MG/DL — SIGNIFICANT CHANGE UP
HCT VFR BLD CALC: 40.5 % — SIGNIFICANT CHANGE UP (ref 39–50)
HGB BLD-MCNC: 13.8 G/DL — SIGNIFICANT CHANGE UP (ref 13–17)
IMM GRANULOCYTES NFR BLD AUTO: 0.5 % — SIGNIFICANT CHANGE UP (ref 0–0.9)
KETONES UR-MCNC: NEGATIVE MG/DL — SIGNIFICANT CHANGE UP
LEUKOCYTE ESTERASE UR-ACNC: ABNORMAL
LYMPHOCYTES # BLD AUTO: 1.25 K/UL — SIGNIFICANT CHANGE UP (ref 1–3.3)
LYMPHOCYTES # BLD AUTO: 11 % — LOW (ref 13–44)
MCHC RBC-ENTMCNC: 30.9 PG — SIGNIFICANT CHANGE UP (ref 27–34)
MCHC RBC-ENTMCNC: 34.1 G/DL — SIGNIFICANT CHANGE UP (ref 32–36)
MCV RBC AUTO: 90.6 FL — SIGNIFICANT CHANGE UP (ref 80–100)
MONOCYTES # BLD AUTO: 1.36 K/UL — HIGH (ref 0–0.9)
MONOCYTES NFR BLD AUTO: 12 % — SIGNIFICANT CHANGE UP (ref 2–14)
NEUTROPHILS # BLD AUTO: 8.46 K/UL — HIGH (ref 1.8–7.4)
NEUTROPHILS NFR BLD AUTO: 74.8 % — SIGNIFICANT CHANGE UP (ref 43–77)
NITRITE UR-MCNC: NEGATIVE — SIGNIFICANT CHANGE UP
NRBC # BLD: 0 /100 WBCS — SIGNIFICANT CHANGE UP (ref 0–0)
PH UR: 8.5 (ref 5–8)
PLATELET # BLD AUTO: 260 K/UL — SIGNIFICANT CHANGE UP (ref 150–400)
POTASSIUM SERPL-MCNC: 3.5 MMOL/L — SIGNIFICANT CHANGE UP (ref 3.5–5.3)
POTASSIUM SERPL-SCNC: 3.5 MMOL/L — SIGNIFICANT CHANGE UP (ref 3.5–5.3)
PROT UR-MCNC: 30 MG/DL
RBC # BLD: 4.47 M/UL — SIGNIFICANT CHANGE UP (ref 4.2–5.8)
RBC # FLD: 12.5 % — SIGNIFICANT CHANGE UP (ref 10.3–14.5)
RBC CASTS # UR COMP ASSIST: SIGNIFICANT CHANGE UP /HPF (ref 0–4)
SODIUM SERPL-SCNC: 139 MMOL/L — SIGNIFICANT CHANGE UP (ref 135–145)
SP GR SPEC: 1.01 — SIGNIFICANT CHANGE UP (ref 1–1.03)
UROBILINOGEN FLD QL: 0.2 MG/DL — SIGNIFICANT CHANGE UP (ref 0.2–1)
WBC # BLD: 11.33 K/UL — HIGH (ref 3.8–10.5)
WBC # FLD AUTO: 11.33 K/UL — HIGH (ref 3.8–10.5)
WBC UR QL: ABNORMAL /HPF (ref 0–5)

## 2025-01-08 PROCEDURE — 87077 CULTURE AEROBIC IDENTIFY: CPT

## 2025-01-08 PROCEDURE — 80048 BASIC METABOLIC PNL TOTAL CA: CPT

## 2025-01-08 PROCEDURE — 99284 EMERGENCY DEPT VISIT MOD MDM: CPT | Mod: 25

## 2025-01-08 PROCEDURE — 51702 INSERT TEMP BLADDER CATH: CPT

## 2025-01-08 PROCEDURE — 81001 URINALYSIS AUTO W/SCOPE: CPT

## 2025-01-08 PROCEDURE — 87086 URINE CULTURE/COLONY COUNT: CPT

## 2025-01-08 PROCEDURE — 85025 COMPLETE CBC W/AUTO DIFF WBC: CPT

## 2025-01-08 PROCEDURE — 99284 EMERGENCY DEPT VISIT MOD MDM: CPT

## 2025-01-08 PROCEDURE — 36000 PLACE NEEDLE IN VEIN: CPT

## 2025-01-08 PROCEDURE — 36415 COLL VENOUS BLD VENIPUNCTURE: CPT

## 2025-01-08 PROCEDURE — 87186 SC STD MICRODIL/AGAR DIL: CPT

## 2025-01-08 RX ORDER — LIDOCAINE HYDROCHLORIDE 10 MG/ML
10 INJECTION INFILTRATION; PERINEURAL ONCE
Refills: 0 | Status: COMPLETED | OUTPATIENT
Start: 2025-01-08 | End: 2025-01-08

## 2025-01-08 NOTE — ED PROVIDER NOTE - PATIENT PORTAL LINK FT
You can access the FollowMyHealth Patient Portal offered by [facility] by registering at the following website: [email or web address]. By joining [employer]’s FollowMyHealth portal, you will also be able to view your health information using other applications (apps) compatible with our system.

## 2025-01-08 NOTE — ED ADULT TRIAGE NOTE - CHIEF COMPLAINT QUOTE
Difficulty urinating x 6 days ,was seen in ER on 12/30/24 for same and was prescribed tamsulosin at the time

## 2025-01-08 NOTE — ED ADULT NURSE NOTE - OBJECTIVE STATEMENT
Patient c/o urine retention, had ceballos placed on last visit and last thursday had ceballos removed at [facility].

## 2025-01-08 NOTE — ED ADULT NURSE NOTE - CAS ELECT INFOMATION PROVIDED
Patient discharge and  ceballos care education provided to patient, instruicted  to follow up with  outpatient urologist, patient verbalized understanding./DC instructions

## 2025-01-08 NOTE — ED PROVIDER NOTE - PHYSICAL EXAMINATION
CONSTITUTIONAL: non-toxic, well appearing  SKIN: no rash, no petechiae.  EYES: pink conjunctiva, anicteric  NECK: Supple; no meningismus, no JVD  CARD: RRR, no murmurs, equal radial pulses bilaterally 2+  RESP: CTAB, no respiratory distress  ABD: Soft, tender over suprapubic region with palpable distended bladder, non-distended, no peritoneal signs, no CVA tenderness  EXT: Normal ROM x4. No edema.   NEURO: Alert, oriented. Neuro exam nonfocal, equal strength bilaterally  PSYCH: Cooperative, appropriate.

## 2025-01-08 NOTE — ED ADULT NURSE NOTE - NSFALLUNIVINTERV_ED_ALL_ED
Bed/Stretcher in lowest position, wheels locked, appropriate side rails in place/Call bell, personal items and telephone in reach/Instruct patient to call for assistance before getting out of bed/chair/stretcher/Non-slip footwear applied when patient is off stretcher/[place] to call system/Physically safe environment - no spills, clutter or unnecessary equipment/Purposeful proactive rounding/Room/bathroom lighting operational, light cord in reach

## 2025-01-08 NOTE — ED PROVIDER NOTE - PROGRESS NOTE DETAILS
Lucks-DO: Labs without evidence of acute kidney injury, urinalysis showing white blood cells, patient with recent Lyles catheter, no UTI symptoms, culture sent.  No evidence of postobstructive diuresis.  Will DC with urology follow-up, return precautions provided.

## 2025-01-08 NOTE — ED PROVIDER NOTE - NSFOLLOWUPINSTRUCTIONS_ED_ALL_ED_FT
Retención urinaria aguda en los hombres  Acute Urinary Retention, Male       La retención urinaria aguda es kalie afección en la cual la persona no puede orinar. [place] puede durar poco o mucho tiempo. Si no se trata, puede resultar en daño renal u otras complicaciones graves.    ¿Cuáles son las causas?  Esta afección puede ser causada por lo siguiente:    Obstrucción o estrechamiento del tubo que drena la vejiga (uretra). [place] puede estar causado por kalie cirugía o problemas en órganos cercanos, trena la glándula prostática, que pueden presionar o apretar la uretra.  Problemas con los nervios de la vejiga. [place] puede estar causado por enfermedades tales trena esclerosis múltiple, o por lesiones en la médula scherer.  Ciertos medicamentos.  Tumores en el área de la pelvis, la vejiga o la uretra.  Diabetes.  Trastornos cognitivos degenerativos, trena delirios o demencia.  Infección de vejiga o de las vías urinarias.  Estreñimiento.  John en la orina (hematuria).  Daño en la uretra o la vejiga.   Trastornos psicológicos (psicógenos). [place] persona puede aguantar la orina por un traumatismo o porque no quiere usar el baño.    ¿Qué incrementa el riesgo?  Es más probable que esta afección se desarrolle en hombres mayores. A medida que los hombres envejecen, la próstata se puede agrandar y comenzar a presionar o apretar la vejiga o la uretra.    ¿Cuáles son los signos o los síntomas?  Los síntomas de esta afección incluyen los siguientes:    Dificultad para orinar.  Dolor en la parte baja del abdomen.    Generalmente, los síntomas se desarrollan lentamente a lo neena de un período de tiempo.    ¿Cómo se diagnostica?  Esta afección se diagnostica en función de un examen físico y los antecedentes médicos. También pueden hacerle otros estudios, por ejemplo:    Kalie ecografía de la vejiga, del riñón o de ambos.  Análisis de john.  Análisis de orina.  Puede que se necesiten exámenes adicionales, tales trena resonancia magnética (RM), y pruebas funcionales del riñón o de la vejiga.    ¿Cómo se trata?  El tratamiento de esta afección puede incluir lo siguiente:    Medicamentos.  Colocar un tubo jordan estéril (catéter) en la vejiga para drenar la orina del cuerpo. [place] recibe el nombre de catéter urinario permanente. Luego de que se inserte, el catéter se mantiene en spicer lugar con un pequeño balón que se llena con agua estéril. La orina se drena desde el catéter hasta la bolsa de recolección fuera del cuerpo.  Terapia conductual.  Tratamiento para cualquier afección preexistente.  De ser necesario, puede recibir tratamiento en el hospital para problemas de la función renal o para tratar otras complicaciones.    Siga estas indicaciones en spicer casa:  [place] los medicamentos de venta cynthia y los recetados solamente trena se lo haya indicado el médico. Evite determinados medicamentos, tales trena descongestivos, antihistamínicos y algunos medicamentos recetados. No tome medicamentos a menos que lo haya autorizado el médico.  Si le indicaron que use un catéter urinario permanente, cuídelo trena se lo haya indicado el médico.  Kayli suficiente líquido para mantener la orina yeyo o de color amarillo pálido.  Si le recetaron un antibiótico, tómelo trena se lo haya indicado el médico. No deje de isis los antibióticos aunque comience a sentirse mejor.  No consuma ningún producto que contenga nicotina o tabaco, trena cigarrillos y cigarrillos electrónicos. Si necesita ayuda para dejar de fumar, consulte al médico.  Controle si hay cambios en los síntomas. Informe al médico acerca de cualquier cambio.  Si así se le indica, controle spicer presión arterial en casa. Informe cualquier cambio en spicer mariann trena se lo haya indicado el médico.  Concurra a todas las visitas de control trena se lo haya indicado el médico. [place] es importante.    Comuníquese con un médico si:  Tiene contracciones de vejiga incómodas que no puede controlar (espasmos) o pérdida de orina gurdeep los espasmos.    Solicite ayuda de inmediato si:  Tiene escalofríos o fiebre.  Observa john en la orina.  Tiene un catéter y:    El catéter ingrid de drenar orina.  El catéter se sale del lugar.    Resumen  La retención urinaria aguda es kalie afección en la cual la persona no puede orinar. Si no se trata, puede resultar en daño renal u otras complicaciones graves.  La causa de esta afección puede ser kalie próstata agrandada. A medida que los hombres envejecen, la glándula prostática se puede agrandar y comenzar a presionar o apretar la vejiga o la uretra.  El tratamiento de esta afección puede incluir medicamentos y el uso de un catéter urinario permanente.  Controle si hay cambios en los síntomas. Informe al médico acerca de cualquier cambio.

## 2025-01-08 NOTE — ED PROVIDER NOTE - OBJECTIVE STATEMENT
59-year-old male with past medical history psoriasis, BPH, recent admission for urinary retention status post Lyles catheter complicated by postobstructive diuresis presents with urinary urgency x 6 days.  Patient states he had Lyles catheter removed by urologist at [facility] 1 week ago, reports urinary frequency, urine urgency, suprapubic discomfort over the past 6 days.  Patient also states he has not had a bowel movement in 3 days.  Denies any fevers, chest pain, shortness of breath, back pain, pain with urination, hematuria, numbness, weakness, saddle anesthesia, or rash.  Patient states current symptoms are similar to when he required Lyles catheter in the past.  Denies any additional complaints.

## 2025-01-08 NOTE — ED PROVIDER NOTE - CLINICAL SUMMARY MEDICAL DECISION MAKING FREE TEXT BOX
John: 59-year-old male with past medical history psoriasis, BPH, recent admission for urinary retention status post Lyles catheter complicated by postobstructive diuresis presents with urinary urgency x 6 days.  Patient states he had Lyles catheter removed by urologist at [facility] 1 week ago, reports urinary frequency, urine urgency, suprapubic discomfort over the past 6 days.  Patient also states he has not had a bowel movement in 3 days.  Denies any fevers, chest pain, shortness of breath, back pain, pain with urination, hematuria, numbness, weakness, saddle anesthesia, or rash.  Patient states current symptoms are similar to when he required Lyles catheter in the past.  Physical exam per above. Postvoid residual greater than 600 cc consistent with urinary retention, likely secondary to BPH.  No fevers or infectious symptoms.  Will obtain labs rule out acute kidney injury, rule out urinary tract infection, Lyles catheterization, supportive treatment with disposition pending workup.

## 2025-01-11 LAB
-  AMOXICILLIN/CLAVULANIC ACID: SIGNIFICANT CHANGE UP
-  AMPICILLIN/SULBACTAM: SIGNIFICANT CHANGE UP
-  AMPICILLIN: SIGNIFICANT CHANGE UP
-  AZTREONAM: SIGNIFICANT CHANGE UP
-  CEFAZOLIN: SIGNIFICANT CHANGE UP
-  CEFEPIME: SIGNIFICANT CHANGE UP
-  CEFOXITIN: SIGNIFICANT CHANGE UP
-  CEFTRIAXONE: SIGNIFICANT CHANGE UP
-  CEFUROXIME: SIGNIFICANT CHANGE UP
-  CIPROFLOXACIN: SIGNIFICANT CHANGE UP
-  ERTAPENEM: SIGNIFICANT CHANGE UP
-  GENTAMICIN: SIGNIFICANT CHANGE UP
-  LEVOFLOXACIN: SIGNIFICANT CHANGE UP
-  MEROPENEM: SIGNIFICANT CHANGE UP
-  NITROFURANTOIN: SIGNIFICANT CHANGE UP
-  PIPERACILLIN/TAZOBACTAM: SIGNIFICANT CHANGE UP
-  TOBRAMYCIN: SIGNIFICANT CHANGE UP
-  TRIMETHOPRIM/SULFAMETHOXAZOLE: SIGNIFICANT CHANGE UP
METHOD TYPE: SIGNIFICANT CHANGE UP

## 2025-01-12 LAB
-  AMPICILLIN: SIGNIFICANT CHANGE UP
-  CIPROFLOXACIN: SIGNIFICANT CHANGE UP
-  LEVOFLOXACIN: SIGNIFICANT CHANGE UP
-  NITROFURANTOIN: SIGNIFICANT CHANGE UP
-  TETRACYCLINE: SIGNIFICANT CHANGE UP
-  VANCOMYCIN: SIGNIFICANT CHANGE UP
CULTURE RESULTS: ABNORMAL
METHOD TYPE: SIGNIFICANT CHANGE UP
ORGANISM # SPEC MICROSCOPIC CNT: ABNORMAL
SPECIMEN SOURCE: SIGNIFICANT CHANGE UP